# Patient Record
Sex: FEMALE | Race: WHITE | Employment: OTHER | URBAN - METROPOLITAN AREA
[De-identification: names, ages, dates, MRNs, and addresses within clinical notes are randomized per-mention and may not be internally consistent; named-entity substitution may affect disease eponyms.]

---

## 2020-11-21 ENCOUNTER — APPOINTMENT (OUTPATIENT)
Dept: GENERAL RADIOLOGY | Age: 80
DRG: 492 | End: 2020-11-21
Payer: MEDICARE

## 2020-11-21 ENCOUNTER — HOSPITAL ENCOUNTER (INPATIENT)
Age: 80
LOS: 9 days | Discharge: NURSING, REHAB OR LONG-TERM CARE | DRG: 492 | End: 2020-12-01
Attending: EMERGENCY MEDICINE | Admitting: FAMILY MEDICINE
Payer: MEDICARE

## 2020-11-21 PROCEDURE — 85610 PROTHROMBIN TIME: CPT

## 2020-11-21 PROCEDURE — 86900 BLOOD TYPING SEROLOGIC ABO: CPT

## 2020-11-21 PROCEDURE — 86850 RBC ANTIBODY SCREEN: CPT

## 2020-11-21 PROCEDURE — 93005 ELECTROCARDIOGRAM TRACING: CPT | Performed by: EMERGENCY MEDICINE

## 2020-11-21 PROCEDURE — 86901 BLOOD TYPING SEROLOGIC RH(D): CPT

## 2020-11-21 PROCEDURE — 73600 X-RAY EXAM OF ANKLE: CPT

## 2020-11-21 PROCEDURE — 71045 X-RAY EXAM CHEST 1 VIEW: CPT

## 2020-11-21 PROCEDURE — 80053 COMPREHEN METABOLIC PANEL: CPT

## 2020-11-21 PROCEDURE — 99285 EMERGENCY DEPT VISIT HI MDM: CPT

## 2020-11-21 PROCEDURE — 84484 ASSAY OF TROPONIN QUANT: CPT

## 2020-11-21 PROCEDURE — 85025 COMPLETE CBC W/AUTO DIFF WBC: CPT

## 2020-11-21 ASSESSMENT — PAIN DESCRIPTION - LOCATION: LOCATION: ANKLE

## 2020-11-21 ASSESSMENT — PAIN DESCRIPTION - ORIENTATION: ORIENTATION: LEFT

## 2020-11-21 ASSESSMENT — PAIN SCALES - GENERAL: PAINLEVEL_OUTOF10: 2

## 2020-11-21 ASSESSMENT — PAIN DESCRIPTION - DESCRIPTORS: DESCRIPTORS: PATIENT UNABLE TO DESCRIBE

## 2020-11-22 ENCOUNTER — ANESTHESIA EVENT (OUTPATIENT)
Dept: OPERATING ROOM | Age: 80
DRG: 492 | End: 2020-11-22
Payer: MEDICARE

## 2020-11-22 ENCOUNTER — APPOINTMENT (OUTPATIENT)
Dept: GENERAL RADIOLOGY | Age: 80
DRG: 492 | End: 2020-11-22
Payer: MEDICARE

## 2020-11-22 ENCOUNTER — ANESTHESIA (OUTPATIENT)
Dept: OPERATING ROOM | Age: 80
DRG: 492 | End: 2020-11-22
Payer: MEDICARE

## 2020-11-22 VITALS — TEMPERATURE: 96.3 F | OXYGEN SATURATION: 97 %

## 2020-11-22 PROBLEM — Z79.01 SUBTHERAPEUTIC ANTICOAGULATION: Status: ACTIVE | Noted: 2020-11-22

## 2020-11-22 PROBLEM — S82.892B ANKLE FRACTURE, LEFT, OPEN TYPE I OR II, INITIAL ENCOUNTER: Status: ACTIVE | Noted: 2020-11-22

## 2020-11-22 PROBLEM — E11.9 TYPE 2 DIABETES MELLITUS (HCC): Status: ACTIVE | Noted: 2020-11-22

## 2020-11-22 PROBLEM — J44.9 COPD (CHRONIC OBSTRUCTIVE PULMONARY DISEASE) (HCC): Status: ACTIVE | Noted: 2020-11-22

## 2020-11-22 PROBLEM — R26.2 AMBULATORY DYSFUNCTION: Status: ACTIVE | Noted: 2020-11-22

## 2020-11-22 PROBLEM — S82.892A ANKLE FRACTURE, LEFT, CLOSED, INITIAL ENCOUNTER: Status: ACTIVE | Noted: 2020-11-22

## 2020-11-22 PROBLEM — F17.200 TOBACCO DEPENDENCE: Status: ACTIVE | Noted: 2020-11-22

## 2020-11-22 PROBLEM — N18.4 STAGE 4 CHRONIC KIDNEY DISEASE (HCC): Status: ACTIVE | Noted: 2020-11-22

## 2020-11-22 PROBLEM — I45.10 RBBB: Status: ACTIVE | Noted: 2020-11-22

## 2020-11-22 PROBLEM — Z51.81 SUBTHERAPEUTIC ANTICOAGULATION: Status: ACTIVE | Noted: 2020-11-22

## 2020-11-22 PROBLEM — R29.6 FALLS FREQUENTLY: Status: ACTIVE | Noted: 2020-11-22

## 2020-11-22 PROBLEM — R53.1 WEAKNESS: Status: ACTIVE | Noted: 2020-11-22

## 2020-11-22 PROBLEM — Z01.818 PRE-OP EVALUATION: Status: ACTIVE | Noted: 2020-11-22

## 2020-11-22 PROBLEM — Z87.09 HISTORY OF CHRONIC RESPIRATORY FAILURE: Status: ACTIVE | Noted: 2020-11-22

## 2020-11-22 PROBLEM — I48.91 ATRIAL FIBRILLATION (HCC): Status: ACTIVE | Noted: 2020-11-22

## 2020-11-22 LAB
ABO/RH: NORMAL
ALBUMIN SERPL-MCNC: 3.1 G/DL (ref 3.5–5.2)
ALP BLD-CCNC: 101 U/L (ref 35–104)
ALT SERPL-CCNC: 6 U/L (ref 0–32)
ANION GAP SERPL CALCULATED.3IONS-SCNC: 6 MMOL/L (ref 7–16)
ANION GAP SERPL CALCULATED.3IONS-SCNC: 9 MMOL/L (ref 7–16)
ANTIBODY SCREEN: NORMAL
APTT: 27.4 SEC (ref 24.5–35.1)
AST SERPL-CCNC: 11 U/L (ref 0–31)
BASOPHILS ABSOLUTE: 0.05 E9/L (ref 0–0.2)
BASOPHILS ABSOLUTE: 0.06 E9/L (ref 0–0.2)
BASOPHILS RELATIVE PERCENT: 0.7 % (ref 0–2)
BASOPHILS RELATIVE PERCENT: 0.9 % (ref 0–2)
BILIRUB SERPL-MCNC: 0.6 MG/DL (ref 0–1.2)
BUN BLDV-MCNC: 35 MG/DL (ref 8–23)
BUN BLDV-MCNC: 35 MG/DL (ref 8–23)
CALCIUM SERPL-MCNC: 9.4 MG/DL (ref 8.6–10.2)
CALCIUM SERPL-MCNC: 9.9 MG/DL (ref 8.6–10.2)
CHLORIDE BLD-SCNC: 104 MMOL/L (ref 98–107)
CHLORIDE BLD-SCNC: 106 MMOL/L (ref 98–107)
CO2: 25 MMOL/L (ref 22–29)
CO2: 31 MMOL/L (ref 22–29)
CREAT SERPL-MCNC: 2.3 MG/DL (ref 0.5–1)
CREAT SERPL-MCNC: 2.4 MG/DL (ref 0.5–1)
EKG ATRIAL RATE: 64 BPM
EKG P AXIS: 72 DEGREES
EKG P-R INTERVAL: 198 MS
EKG Q-T INTERVAL: 442 MS
EKG QRS DURATION: 142 MS
EKG QTC CALCULATION (BAZETT): 455 MS
EKG R AXIS: 25 DEGREES
EKG T AXIS: -21 DEGREES
EKG VENTRICULAR RATE: 64 BPM
EOSINOPHILS ABSOLUTE: 0.06 E9/L (ref 0.05–0.5)
EOSINOPHILS ABSOLUTE: 0.1 E9/L (ref 0.05–0.5)
EOSINOPHILS RELATIVE PERCENT: 0.7 % (ref 0–6)
EOSINOPHILS RELATIVE PERCENT: 1.7 % (ref 0–6)
GFR AFRICAN AMERICAN: 23
GFR AFRICAN AMERICAN: 25
GFR NON-AFRICAN AMERICAN: 19 ML/MIN/1.73
GFR NON-AFRICAN AMERICAN: 20 ML/MIN/1.73
GLUCOSE BLD-MCNC: 84 MG/DL (ref 74–99)
GLUCOSE BLD-MCNC: 99 MG/DL (ref 74–99)
HBA1C MFR BLD: 5.1 % (ref 4–5.6)
HCT VFR BLD CALC: 41.1 % (ref 34–48)
HCT VFR BLD CALC: 41.7 % (ref 34–48)
HEMOGLOBIN: 12.8 G/DL (ref 11.5–15.5)
HEMOGLOBIN: 12.9 G/DL (ref 11.5–15.5)
IMMATURE GRANULOCYTES #: 0.03 E9/L
IMMATURE GRANULOCYTES #: 0.04 E9/L
IMMATURE GRANULOCYTES %: 0.5 % (ref 0–5)
IMMATURE GRANULOCYTES %: 0.5 % (ref 0–5)
INR BLD: 1.2
LYMPHOCYTES ABSOLUTE: 0.83 E9/L (ref 1.5–4)
LYMPHOCYTES ABSOLUTE: 1.05 E9/L (ref 1.5–4)
LYMPHOCYTES RELATIVE PERCENT: 12.1 % (ref 20–42)
LYMPHOCYTES RELATIVE PERCENT: 14.1 % (ref 20–42)
MCH RBC QN AUTO: 31.2 PG (ref 26–35)
MCH RBC QN AUTO: 31.9 PG (ref 26–35)
MCHC RBC AUTO-ENTMCNC: 30.7 % (ref 32–34.5)
MCHC RBC AUTO-ENTMCNC: 31.4 % (ref 32–34.5)
MCV RBC AUTO: 101.7 FL (ref 80–99.9)
MCV RBC AUTO: 101.7 FL (ref 80–99.9)
METER GLUCOSE: 128 MG/DL (ref 74–99)
METER GLUCOSE: 142 MG/DL (ref 74–99)
METER GLUCOSE: 146 MG/DL (ref 74–99)
METER GLUCOSE: 88 MG/DL (ref 74–99)
MONOCYTES ABSOLUTE: 0.63 E9/L (ref 0.1–0.95)
MONOCYTES ABSOLUTE: 0.82 E9/L (ref 0.1–0.95)
MONOCYTES RELATIVE PERCENT: 10.7 % (ref 2–12)
MONOCYTES RELATIVE PERCENT: 9.5 % (ref 2–12)
NEUTROPHILS ABSOLUTE: 4.23 E9/L (ref 1.8–7.3)
NEUTROPHILS ABSOLUTE: 6.62 E9/L (ref 1.8–7.3)
NEUTROPHILS RELATIVE PERCENT: 72.1 % (ref 43–80)
NEUTROPHILS RELATIVE PERCENT: 76.5 % (ref 43–80)
PDW BLD-RTO: 16.3 FL (ref 11.5–15)
PDW BLD-RTO: 16.5 FL (ref 11.5–15)
PLATELET # BLD: 150 E9/L (ref 130–450)
PLATELET # BLD: 151 E9/L (ref 130–450)
PMV BLD AUTO: 10.2 FL (ref 7–12)
PMV BLD AUTO: 11.1 FL (ref 7–12)
POTASSIUM REFLEX MAGNESIUM: 4.3 MMOL/L (ref 3.5–5)
POTASSIUM SERPL-SCNC: 4.5 MMOL/L (ref 3.5–5)
PROTHROMBIN TIME: 12.9 SEC (ref 9.3–12.4)
RBC # BLD: 4.04 E12/L (ref 3.5–5.5)
RBC # BLD: 4.1 E12/L (ref 3.5–5.5)
SODIUM BLD-SCNC: 138 MMOL/L (ref 132–146)
SODIUM BLD-SCNC: 143 MMOL/L (ref 132–146)
TOTAL PROTEIN: 5.7 G/DL (ref 6.4–8.3)
TROPONIN: 0.02 NG/ML (ref 0–0.03)
TSH SERPL DL<=0.05 MIU/L-ACNC: 1.62 UIU/ML (ref 0.27–4.2)
WBC # BLD: 5.9 E9/L (ref 4.5–11.5)
WBC # BLD: 8.7 E9/L (ref 4.5–11.5)

## 2020-11-22 PROCEDURE — 6360000002 HC RX W HCPCS: Performed by: STUDENT IN AN ORGANIZED HEALTH CARE EDUCATION/TRAINING PROGRAM

## 2020-11-22 PROCEDURE — 6360000002 HC RX W HCPCS

## 2020-11-22 PROCEDURE — 3600000015 HC SURGERY LEVEL 5 ADDTL 15MIN: Performed by: ORTHOPAEDIC SURGERY

## 2020-11-22 PROCEDURE — 51798 US URINE CAPACITY MEASURE: CPT

## 2020-11-22 PROCEDURE — 7100000000 HC PACU RECOVERY - FIRST 15 MIN: Performed by: ORTHOPAEDIC SURGERY

## 2020-11-22 PROCEDURE — 2720000010 HC SURG SUPPLY STERILE: Performed by: ORTHOPAEDIC SURGERY

## 2020-11-22 PROCEDURE — 2500000003 HC RX 250 WO HCPCS

## 2020-11-22 PROCEDURE — 2700000000 HC OXYGEN THERAPY PER DAY

## 2020-11-22 PROCEDURE — 0QSH04Z REPOSITION LEFT TIBIA WITH INTERNAL FIXATION DEVICE, OPEN APPROACH: ICD-10-PCS | Performed by: ORTHOPAEDIC SURGERY

## 2020-11-22 PROCEDURE — 6370000000 HC RX 637 (ALT 250 FOR IP): Performed by: FAMILY MEDICINE

## 2020-11-22 PROCEDURE — 6360000002 HC RX W HCPCS: Performed by: FAMILY MEDICINE

## 2020-11-22 PROCEDURE — 2580000003 HC RX 258: Performed by: EMERGENCY MEDICINE

## 2020-11-22 PROCEDURE — 0QSK04Z REPOSITION LEFT FIBULA WITH INTERNAL FIXATION DEVICE, OPEN APPROACH: ICD-10-PCS | Performed by: ORTHOPAEDIC SURGERY

## 2020-11-22 PROCEDURE — 6360000002 HC RX W HCPCS: Performed by: EMERGENCY MEDICINE

## 2020-11-22 PROCEDURE — 3700000000 HC ANESTHESIA ATTENDED CARE: Performed by: ORTHOPAEDIC SURGERY

## 2020-11-22 PROCEDURE — 2709999900 HC NON-CHARGEABLE SUPPLY: Performed by: ORTHOPAEDIC SURGERY

## 2020-11-22 PROCEDURE — 7100000001 HC PACU RECOVERY - ADDTL 15 MIN: Performed by: ORTHOPAEDIC SURGERY

## 2020-11-22 PROCEDURE — 1200000000 HC SEMI PRIVATE

## 2020-11-22 PROCEDURE — 94664 DEMO&/EVAL PT USE INHALER: CPT

## 2020-11-22 PROCEDURE — 6370000000 HC RX 637 (ALT 250 FOR IP): Performed by: STUDENT IN AN ORGANIZED HEALTH CARE EDUCATION/TRAINING PROGRAM

## 2020-11-22 PROCEDURE — C1713 ANCHOR/SCREW BN/BN,TIS/BN: HCPCS | Performed by: ORTHOPAEDIC SURGERY

## 2020-11-22 PROCEDURE — 3600000005 HC SURGERY LEVEL 5 BASE: Performed by: ORTHOPAEDIC SURGERY

## 2020-11-22 PROCEDURE — 94640 AIRWAY INHALATION TREATMENT: CPT

## 2020-11-22 PROCEDURE — 85025 COMPLETE CBC W/AUTO DIFF WBC: CPT

## 2020-11-22 PROCEDURE — 6360000002 HC RX W HCPCS: Performed by: ANESTHESIOLOGY

## 2020-11-22 PROCEDURE — 80048 BASIC METABOLIC PNL TOTAL CA: CPT

## 2020-11-22 PROCEDURE — 97162 PT EVAL MOD COMPLEX 30 MIN: CPT

## 2020-11-22 PROCEDURE — 85730 THROMBOPLASTIN TIME PARTIAL: CPT

## 2020-11-22 PROCEDURE — 90715 TDAP VACCINE 7 YRS/> IM: CPT | Performed by: EMERGENCY MEDICINE

## 2020-11-22 PROCEDURE — 99222 1ST HOSP IP/OBS MODERATE 55: CPT | Performed by: ORTHOPAEDIC SURGERY

## 2020-11-22 PROCEDURE — 84443 ASSAY THYROID STIM HORMONE: CPT

## 2020-11-22 PROCEDURE — 83036 HEMOGLOBIN GLYCOSYLATED A1C: CPT

## 2020-11-22 PROCEDURE — 3700000001 HC ADD 15 MINUTES (ANESTHESIA): Performed by: ORTHOPAEDIC SURGERY

## 2020-11-22 PROCEDURE — 6370000000 HC RX 637 (ALT 250 FOR IP): Performed by: ORTHOPAEDIC SURGERY

## 2020-11-22 PROCEDURE — 73610 X-RAY EXAM OF ANKLE: CPT

## 2020-11-22 PROCEDURE — 27814 TREATMENT OF ANKLE FRACTURE: CPT | Performed by: ORTHOPAEDIC SURGERY

## 2020-11-22 PROCEDURE — 97530 THERAPEUTIC ACTIVITIES: CPT

## 2020-11-22 PROCEDURE — 90471 IMMUNIZATION ADMIN: CPT | Performed by: EMERGENCY MEDICINE

## 2020-11-22 PROCEDURE — 96374 THER/PROPH/DIAG INJ IV PUSH: CPT

## 2020-11-22 PROCEDURE — 11012 DEB SKIN BONE AT FX SITE: CPT | Performed by: ORTHOPAEDIC SURGERY

## 2020-11-22 PROCEDURE — 27829 TREAT LOWER LEG JOINT: CPT | Performed by: ORTHOPAEDIC SURGERY

## 2020-11-22 PROCEDURE — 3209999900 FLUORO FOR SURGICAL PROCEDURES

## 2020-11-22 PROCEDURE — 93010 ELECTROCARDIOGRAM REPORT: CPT | Performed by: INTERNAL MEDICINE

## 2020-11-22 PROCEDURE — C1769 GUIDE WIRE: HCPCS | Performed by: ORTHOPAEDIC SURGERY

## 2020-11-22 PROCEDURE — 82962 GLUCOSE BLOOD TEST: CPT

## 2020-11-22 PROCEDURE — 36415 COLL VENOUS BLD VENIPUNCTURE: CPT

## 2020-11-22 PROCEDURE — 96375 TX/PRO/DX INJ NEW DRUG ADDON: CPT

## 2020-11-22 PROCEDURE — 2580000003 HC RX 258

## 2020-11-22 PROCEDURE — 2580000003 HC RX 258: Performed by: FAMILY MEDICINE

## 2020-11-22 PROCEDURE — 2580000003 HC RX 258: Performed by: STUDENT IN AN ORGANIZED HEALTH CARE EDUCATION/TRAINING PROGRAM

## 2020-11-22 DEVICE — SCREW BNE L16MM DIA3.5MM CORT S STL ST LOK FULL THRD: Type: IMPLANTABLE DEVICE | Site: TIBIA | Status: FUNCTIONAL

## 2020-11-22 DEVICE — SCREW BNE L46MM DIA3.5MM CORT S STL ST NONCANNULATED LOK: Type: IMPLANTABLE DEVICE | Site: TIBIA | Status: FUNCTIONAL

## 2020-11-22 DEVICE — SCREW BNE L50MM DIA3.5MM CORT S STL ST NONCANNULATED LOK: Type: IMPLANTABLE DEVICE | Site: TIBIA | Status: FUNCTIONAL

## 2020-11-22 DEVICE — SCREW BNE L46MM DIA4MM S STL CANN SHT 1/3 THRD SM HEX SOCK: Type: IMPLANTABLE DEVICE | Site: FIBULA | Status: FUNCTIONAL

## 2020-11-22 DEVICE — SCREW BNE L12MM DIA3.5MM CORT S STL ST NONCANNULATED LOK: Type: IMPLANTABLE DEVICE | Site: TIBIA | Status: FUNCTIONAL

## 2020-11-22 DEVICE — PLATE BNE L117MM 10 H S STL 1/3 TBLR LOK COMPR W/ CLLR FOR: Type: IMPLANTABLE DEVICE | Site: TIBIA | Status: FUNCTIONAL

## 2020-11-22 DEVICE — WASHER ORTH DIA7MM FOR CANN SCR: Type: IMPLANTABLE DEVICE | Site: FIBULA | Status: FUNCTIONAL

## 2020-11-22 RX ORDER — MORPHINE SULFATE 2 MG/ML
2 INJECTION, SOLUTION INTRAMUSCULAR; INTRAVENOUS
Status: DISCONTINUED | OUTPATIENT
Start: 2020-11-22 | End: 2020-12-01 | Stop reason: HOSPADM

## 2020-11-22 RX ORDER — OXYCODONE HYDROCHLORIDE 10 MG/1
10 TABLET ORAL EVERY 4 HOURS PRN
Status: DISCONTINUED | OUTPATIENT
Start: 2020-11-22 | End: 2020-11-22

## 2020-11-22 RX ORDER — TRAMADOL HYDROCHLORIDE 50 MG/1
50 TABLET ORAL EVERY 6 HOURS PRN
Status: DISCONTINUED | OUTPATIENT
Start: 2020-11-22 | End: 2020-12-01 | Stop reason: HOSPADM

## 2020-11-22 RX ORDER — LOVASTATIN 10 MG/1
10 TABLET ORAL NIGHTLY
COMMUNITY

## 2020-11-22 RX ORDER — CEFAZOLIN SODIUM 1 G/3ML
INJECTION, POWDER, FOR SOLUTION INTRAMUSCULAR; INTRAVENOUS PRN
Status: DISCONTINUED | OUTPATIENT
Start: 2020-11-22 | End: 2020-11-22 | Stop reason: SDUPTHER

## 2020-11-22 RX ORDER — ALLOPURINOL 300 MG/1
300 TABLET ORAL DAILY
COMMUNITY

## 2020-11-22 RX ORDER — POTASSIUM CHLORIDE 750 MG/1
10 TABLET, FILM COATED, EXTENDED RELEASE ORAL DAILY
Status: ON HOLD | COMMUNITY
End: 2020-11-30 | Stop reason: HOSPADM

## 2020-11-22 RX ORDER — SODIUM CHLORIDE 9 MG/ML
INJECTION, SOLUTION INTRAVENOUS CONTINUOUS PRN
Status: DISCONTINUED | OUTPATIENT
Start: 2020-11-22 | End: 2020-11-22 | Stop reason: SDUPTHER

## 2020-11-22 RX ORDER — ACETAMINOPHEN 650 MG/1
650 SUPPOSITORY RECTAL EVERY 6 HOURS PRN
Status: DISCONTINUED | OUTPATIENT
Start: 2020-11-22 | End: 2020-11-22 | Stop reason: SDUPTHER

## 2020-11-22 RX ORDER — SODIUM CHLORIDE 0.9 % (FLUSH) 0.9 %
10 SYRINGE (ML) INJECTION PRN
Status: DISCONTINUED | OUTPATIENT
Start: 2020-11-22 | End: 2020-12-01 | Stop reason: HOSPADM

## 2020-11-22 RX ORDER — ONDANSETRON 2 MG/ML
INJECTION INTRAMUSCULAR; INTRAVENOUS PRN
Status: DISCONTINUED | OUTPATIENT
Start: 2020-11-22 | End: 2020-11-22 | Stop reason: SDUPTHER

## 2020-11-22 RX ORDER — SODIUM CHLORIDE 0.9 % (FLUSH) 0.9 %
10 SYRINGE (ML) INJECTION PRN
Status: DISCONTINUED | OUTPATIENT
Start: 2020-11-22 | End: 2020-11-22

## 2020-11-22 RX ORDER — IPRATROPIUM BROMIDE AND ALBUTEROL SULFATE 2.5; .5 MG/3ML; MG/3ML
1 SOLUTION RESPIRATORY (INHALATION) EVERY 4 HOURS PRN
Status: DISCONTINUED | OUTPATIENT
Start: 2020-11-22 | End: 2020-12-01 | Stop reason: HOSPADM

## 2020-11-22 RX ORDER — SODIUM CHLORIDE 0.9 % (FLUSH) 0.9 %
10 SYRINGE (ML) INJECTION EVERY 12 HOURS SCHEDULED
Status: DISCONTINUED | OUTPATIENT
Start: 2020-11-22 | End: 2020-11-22

## 2020-11-22 RX ORDER — PHENYLEPHRINE HCL IN 0.9% NACL 1 MG/10 ML
SYRINGE (ML) INTRAVENOUS PRN
Status: DISCONTINUED | OUTPATIENT
Start: 2020-11-22 | End: 2020-11-22 | Stop reason: SDUPTHER

## 2020-11-22 RX ORDER — SODIUM CHLORIDE 0.9 % (FLUSH) 0.9 %
10 SYRINGE (ML) INJECTION EVERY 12 HOURS SCHEDULED
Status: DISCONTINUED | OUTPATIENT
Start: 2020-11-22 | End: 2020-12-01 | Stop reason: HOSPADM

## 2020-11-22 RX ORDER — OXYCODONE HYDROCHLORIDE 5 MG/1
5 TABLET ORAL EVERY 4 HOURS PRN
Status: DISCONTINUED | OUTPATIENT
Start: 2020-11-22 | End: 2020-11-22

## 2020-11-22 RX ORDER — DEXTROSE MONOHYDRATE 25 G/50ML
12.5 INJECTION, SOLUTION INTRAVENOUS PRN
Status: DISCONTINUED | OUTPATIENT
Start: 2020-11-22 | End: 2020-11-22 | Stop reason: SDUPTHER

## 2020-11-22 RX ORDER — LISINOPRIL 10 MG/1
12.5 TABLET ORAL DAILY
COMMUNITY

## 2020-11-22 RX ORDER — FUROSEMIDE 40 MG/1
60 TABLET ORAL 2 TIMES DAILY
COMMUNITY

## 2020-11-22 RX ORDER — POLYETHYLENE GLYCOL 3350 17 G/17G
17 POWDER, FOR SOLUTION ORAL DAILY PRN
Status: DISCONTINUED | OUTPATIENT
Start: 2020-11-22 | End: 2020-12-01 | Stop reason: HOSPADM

## 2020-11-22 RX ORDER — MEPERIDINE HYDROCHLORIDE 25 MG/ML
12.5 INJECTION INTRAMUSCULAR; INTRAVENOUS; SUBCUTANEOUS EVERY 5 MIN PRN
Status: DISCONTINUED | OUTPATIENT
Start: 2020-11-22 | End: 2020-11-22 | Stop reason: HOSPADM

## 2020-11-22 RX ORDER — PROMETHAZINE HYDROCHLORIDE 25 MG/ML
6.25 INJECTION, SOLUTION INTRAMUSCULAR; INTRAVENOUS
Status: DISCONTINUED | OUTPATIENT
Start: 2020-11-22 | End: 2020-11-22 | Stop reason: HOSPADM

## 2020-11-22 RX ORDER — SENNA PLUS 8.6 MG/1
1 TABLET ORAL DAILY PRN
Status: DISCONTINUED | OUTPATIENT
Start: 2020-11-22 | End: 2020-12-01 | Stop reason: HOSPADM

## 2020-11-22 RX ORDER — FENTANYL CITRATE 50 UG/ML
25 INJECTION, SOLUTION INTRAMUSCULAR; INTRAVENOUS ONCE
Status: COMPLETED | OUTPATIENT
Start: 2020-11-22 | End: 2020-11-22

## 2020-11-22 RX ORDER — NICOTINE POLACRILEX 4 MG
15 LOZENGE BUCCAL PRN
Status: DISCONTINUED | OUTPATIENT
Start: 2020-11-22 | End: 2020-11-22 | Stop reason: SDUPTHER

## 2020-11-22 RX ORDER — GLYCOPYRROLATE 1 MG/5 ML
SYRINGE (ML) INTRAVENOUS PRN
Status: DISCONTINUED | OUTPATIENT
Start: 2020-11-22 | End: 2020-11-22 | Stop reason: SDUPTHER

## 2020-11-22 RX ORDER — ACETAMINOPHEN 650 MG/1
650 SUPPOSITORY RECTAL EVERY 4 HOURS PRN
Status: DISCONTINUED | OUTPATIENT
Start: 2020-11-22 | End: 2020-12-01 | Stop reason: HOSPADM

## 2020-11-22 RX ORDER — ONDANSETRON 2 MG/ML
4 INJECTION INTRAMUSCULAR; INTRAVENOUS EVERY 6 HOURS PRN
Status: DISCONTINUED | OUTPATIENT
Start: 2020-11-22 | End: 2020-12-01 | Stop reason: HOSPADM

## 2020-11-22 RX ORDER — FENTANYL CITRATE 50 UG/ML
25 INJECTION, SOLUTION INTRAMUSCULAR; INTRAVENOUS EVERY 5 MIN PRN
Status: DISCONTINUED | OUTPATIENT
Start: 2020-11-22 | End: 2020-11-22 | Stop reason: HOSPADM

## 2020-11-22 RX ORDER — OXYCODONE HYDROCHLORIDE AND ACETAMINOPHEN 5; 325 MG/1; MG/1
1 TABLET ORAL
Status: DISCONTINUED | OUTPATIENT
Start: 2020-11-22 | End: 2020-11-22 | Stop reason: HOSPADM

## 2020-11-22 RX ORDER — DIAPER,BRIEF,INFANT-TODD,DISP
EACH MISCELLANEOUS PRN
Status: DISCONTINUED | OUTPATIENT
Start: 2020-11-22 | End: 2020-11-22 | Stop reason: HOSPADM

## 2020-11-22 RX ORDER — ACETAMINOPHEN 325 MG/1
650 TABLET ORAL EVERY 6 HOURS PRN
Status: DISCONTINUED | OUTPATIENT
Start: 2020-11-22 | End: 2020-11-22 | Stop reason: SDUPTHER

## 2020-11-22 RX ORDER — AMLODIPINE BESYLATE 5 MG/1
5 TABLET ORAL DAILY
COMMUNITY

## 2020-11-22 RX ORDER — ACETAMINOPHEN 650 MG/1
650 SUPPOSITORY RECTAL EVERY 6 HOURS PRN
Status: DISCONTINUED | OUTPATIENT
Start: 2020-11-22 | End: 2020-11-22

## 2020-11-22 RX ORDER — LIDOCAINE HYDROCHLORIDE 20 MG/ML
INJECTION, SOLUTION INTRAVENOUS PRN
Status: DISCONTINUED | OUTPATIENT
Start: 2020-11-22 | End: 2020-11-22 | Stop reason: SDUPTHER

## 2020-11-22 RX ORDER — MORPHINE SULFATE 4 MG/ML
4 INJECTION, SOLUTION INTRAMUSCULAR; INTRAVENOUS
Status: DISCONTINUED | OUTPATIENT
Start: 2020-11-22 | End: 2020-12-01 | Stop reason: HOSPADM

## 2020-11-22 RX ORDER — IPRATROPIUM BROMIDE AND ALBUTEROL SULFATE 2.5; .5 MG/3ML; MG/3ML
1 SOLUTION RESPIRATORY (INHALATION)
Status: DISCONTINUED | OUTPATIENT
Start: 2020-11-22 | End: 2020-12-01 | Stop reason: HOSPADM

## 2020-11-22 RX ORDER — DEXAMETHASONE SODIUM PHOSPHATE 10 MG/ML
INJECTION INTRAMUSCULAR; INTRAVENOUS PRN
Status: DISCONTINUED | OUTPATIENT
Start: 2020-11-22 | End: 2020-11-22 | Stop reason: SDUPTHER

## 2020-11-22 RX ORDER — DEXTROSE MONOHYDRATE 50 MG/ML
100 INJECTION, SOLUTION INTRAVENOUS PRN
Status: DISCONTINUED | OUTPATIENT
Start: 2020-11-22 | End: 2020-11-22 | Stop reason: SDUPTHER

## 2020-11-22 RX ORDER — LEVOTHYROXINE SODIUM 88 UG/1
88 TABLET ORAL DAILY
COMMUNITY

## 2020-11-22 RX ORDER — PROPOFOL 10 MG/ML
INJECTION, EMULSION INTRAVENOUS PRN
Status: DISCONTINUED | OUTPATIENT
Start: 2020-11-22 | End: 2020-11-22 | Stop reason: SDUPTHER

## 2020-11-22 RX ORDER — MORPHINE SULFATE 2 MG/ML
2 INJECTION, SOLUTION INTRAMUSCULAR; INTRAVENOUS EVERY 4 HOURS PRN
Status: DISCONTINUED | OUTPATIENT
Start: 2020-11-22 | End: 2020-11-22

## 2020-11-22 RX ORDER — DIPHENHYDRAMINE HYDROCHLORIDE 50 MG/ML
12.5 INJECTION INTRAMUSCULAR; INTRAVENOUS
Status: DISCONTINUED | OUTPATIENT
Start: 2020-11-22 | End: 2020-11-22 | Stop reason: HOSPADM

## 2020-11-22 RX ORDER — DEXTROSE MONOHYDRATE 50 MG/ML
100 INJECTION, SOLUTION INTRAVENOUS PRN
Status: DISCONTINUED | OUTPATIENT
Start: 2020-11-22 | End: 2020-12-01 | Stop reason: HOSPADM

## 2020-11-22 RX ORDER — FENTANYL CITRATE 50 UG/ML
50 INJECTION, SOLUTION INTRAMUSCULAR; INTRAVENOUS EVERY 5 MIN PRN
Status: DISCONTINUED | OUTPATIENT
Start: 2020-11-22 | End: 2020-11-22 | Stop reason: HOSPADM

## 2020-11-22 RX ORDER — LEVOTHYROXINE SODIUM 88 UG/1
88 TABLET ORAL DAILY
Status: DISCONTINUED | OUTPATIENT
Start: 2020-11-22 | End: 2020-12-01 | Stop reason: HOSPADM

## 2020-11-22 RX ORDER — OXYCODONE HYDROCHLORIDE AND ACETAMINOPHEN 5; 325 MG/1; MG/1
1 TABLET ORAL EVERY 6 HOURS PRN
Qty: 28 TABLET | Refills: 0 | Status: SHIPPED | OUTPATIENT
Start: 2020-11-22 | End: 2020-11-29

## 2020-11-22 RX ORDER — DEXTROSE MONOHYDRATE 25 G/50ML
12.5 INJECTION, SOLUTION INTRAVENOUS PRN
Status: DISCONTINUED | OUTPATIENT
Start: 2020-11-22 | End: 2020-12-01 | Stop reason: HOSPADM

## 2020-11-22 RX ORDER — NICOTINE POLACRILEX 4 MG
15 LOZENGE BUCCAL PRN
Status: DISCONTINUED | OUTPATIENT
Start: 2020-11-22 | End: 2020-12-01 | Stop reason: HOSPADM

## 2020-11-22 RX ORDER — PROMETHAZINE HYDROCHLORIDE 25 MG/1
12.5 TABLET ORAL EVERY 6 HOURS PRN
Status: DISCONTINUED | OUTPATIENT
Start: 2020-11-22 | End: 2020-12-01 | Stop reason: HOSPADM

## 2020-11-22 RX ORDER — ACETAMINOPHEN 325 MG/1
650 TABLET ORAL EVERY 6 HOURS PRN
Status: DISCONTINUED | OUTPATIENT
Start: 2020-11-22 | End: 2020-11-22

## 2020-11-22 RX ORDER — TRAMADOL HYDROCHLORIDE 50 MG/1
100 TABLET ORAL EVERY 6 HOURS PRN
Status: DISCONTINUED | OUTPATIENT
Start: 2020-11-22 | End: 2020-12-01 | Stop reason: HOSPADM

## 2020-11-22 RX ORDER — NEOSTIGMINE METHYLSULFATE 1 MG/ML
INJECTION, SOLUTION INTRAVENOUS PRN
Status: DISCONTINUED | OUTPATIENT
Start: 2020-11-22 | End: 2020-11-22 | Stop reason: SDUPTHER

## 2020-11-22 RX ORDER — MIDAZOLAM HYDROCHLORIDE 1 MG/ML
INJECTION INTRAMUSCULAR; INTRAVENOUS PRN
Status: DISCONTINUED | OUTPATIENT
Start: 2020-11-22 | End: 2020-11-22 | Stop reason: SDUPTHER

## 2020-11-22 RX ORDER — ROCURONIUM BROMIDE 10 MG/ML
INJECTION, SOLUTION INTRAVENOUS PRN
Status: DISCONTINUED | OUTPATIENT
Start: 2020-11-22 | End: 2020-11-22 | Stop reason: SDUPTHER

## 2020-11-22 RX ORDER — OXYCODONE HYDROCHLORIDE AND ACETAMINOPHEN 5; 325 MG/1; MG/1
1 TABLET ORAL EVERY 4 HOURS PRN
Status: DISCONTINUED | OUTPATIENT
Start: 2020-11-22 | End: 2020-11-22

## 2020-11-22 RX ORDER — AMLODIPINE BESYLATE 5 MG/1
5 TABLET ORAL DAILY
Status: DISCONTINUED | OUTPATIENT
Start: 2020-11-22 | End: 2020-12-01 | Stop reason: HOSPADM

## 2020-11-22 RX ORDER — ACETAMINOPHEN 325 MG/1
650 TABLET ORAL EVERY 4 HOURS PRN
Status: DISCONTINUED | OUTPATIENT
Start: 2020-11-22 | End: 2020-12-01 | Stop reason: HOSPADM

## 2020-11-22 RX ORDER — FENTANYL CITRATE 50 UG/ML
INJECTION, SOLUTION INTRAMUSCULAR; INTRAVENOUS PRN
Status: DISCONTINUED | OUTPATIENT
Start: 2020-11-22 | End: 2020-11-22 | Stop reason: SDUPTHER

## 2020-11-22 RX ADMIN — SODIUM CHLORIDE: 9 INJECTION, SOLUTION INTRAVENOUS at 08:43

## 2020-11-22 RX ADMIN — FENTANYL CITRATE 50 MCG: 50 INJECTION, SOLUTION INTRAMUSCULAR; INTRAVENOUS at 10:38

## 2020-11-22 RX ADMIN — SODIUM CHLORIDE: 9 INJECTION, SOLUTION INTRAVENOUS at 08:28

## 2020-11-22 RX ADMIN — Medication 3 MG: at 10:05

## 2020-11-22 RX ADMIN — Medication 0.6 MG: at 10:05

## 2020-11-22 RX ADMIN — ROCURONIUM BROMIDE 35 MG: 10 INJECTION, SOLUTION INTRAVENOUS at 08:51

## 2020-11-22 RX ADMIN — FENTANYL CITRATE 50 MCG: 50 INJECTION, SOLUTION INTRAMUSCULAR; INTRAVENOUS at 11:05

## 2020-11-22 RX ADMIN — DEXAMETHASONE SODIUM PHOSPHATE 10 MG: 10 INJECTION INTRAMUSCULAR; INTRAVENOUS at 09:10

## 2020-11-22 RX ADMIN — BENZOCAINE AND MENTHOL 1 LOZENGE: 15; 3.6 LOZENGE ORAL at 16:46

## 2020-11-22 RX ADMIN — APIXABAN 2.5 MG: 2.5 TABLET, FILM COATED ORAL at 20:31

## 2020-11-22 RX ADMIN — TRAMADOL HYDROCHLORIDE 100 MG: 50 TABLET, COATED ORAL at 16:41

## 2020-11-22 RX ADMIN — INSULIN LISPRO 2 UNITS: 100 INJECTION, SOLUTION INTRAVENOUS; SUBCUTANEOUS at 17:35

## 2020-11-22 RX ADMIN — FENTANYL CITRATE 20 MCG: 50 INJECTION, SOLUTION INTRAMUSCULAR; INTRAVENOUS at 09:29

## 2020-11-22 RX ADMIN — MIDAZOLAM 0.5 MG: 1 INJECTION INTRAMUSCULAR; INTRAVENOUS at 08:43

## 2020-11-22 RX ADMIN — IPRATROPIUM BROMIDE AND ALBUTEROL SULFATE 1 AMPULE: .5; 3 SOLUTION RESPIRATORY (INHALATION) at 16:52

## 2020-11-22 RX ADMIN — Medication 10 ML: at 20:31

## 2020-11-22 RX ADMIN — Medication 100 MCG: at 08:58

## 2020-11-22 RX ADMIN — IPRATROPIUM BROMIDE AND ALBUTEROL SULFATE 1 AMPULE: .5; 3 SOLUTION RESPIRATORY (INHALATION) at 12:09

## 2020-11-22 RX ADMIN — CEFAZOLIN 2000 MG: 1 INJECTION, POWDER, FOR SOLUTION INTRAMUSCULAR; INTRAVENOUS at 09:14

## 2020-11-22 RX ADMIN — SODIUM CHLORIDE, PRESERVATIVE FREE 10 ML: 5 INJECTION INTRAVENOUS at 17:36

## 2020-11-22 RX ADMIN — Medication 100 MCG: at 08:52

## 2020-11-22 RX ADMIN — ONDANSETRON HYDROCHLORIDE 4 MG: 2 INJECTION, SOLUTION INTRAMUSCULAR; INTRAVENOUS at 09:14

## 2020-11-22 RX ADMIN — FENTANYL CITRATE 20 MCG: 50 INJECTION, SOLUTION INTRAMUSCULAR; INTRAVENOUS at 09:37

## 2020-11-22 RX ADMIN — LEVOTHYROXINE SODIUM 88 MCG: 0.09 TABLET ORAL at 14:41

## 2020-11-22 RX ADMIN — ACETAMINOPHEN 650 MG: 325 TABLET ORAL at 14:47

## 2020-11-22 RX ADMIN — MORPHINE SULFATE 2 MG: 2 INJECTION, SOLUTION INTRAMUSCULAR; INTRAVENOUS at 06:29

## 2020-11-22 RX ADMIN — FENTANYL CITRATE 20 MCG: 50 INJECTION, SOLUTION INTRAMUSCULAR; INTRAVENOUS at 08:51

## 2020-11-22 RX ADMIN — FENTANYL CITRATE 25 MCG: 50 INJECTION, SOLUTION INTRAMUSCULAR; INTRAVENOUS at 00:27

## 2020-11-22 RX ADMIN — OXYCODONE AND ACETAMINOPHEN 1 TABLET: 5; 325 TABLET ORAL at 04:15

## 2020-11-22 RX ADMIN — LIDOCAINE HYDROCHLORIDE 80 MG: 20 INJECTION, SOLUTION INTRAVENOUS at 08:51

## 2020-11-22 RX ADMIN — FENTANYL CITRATE 20 MCG: 50 INJECTION, SOLUTION INTRAMUSCULAR; INTRAVENOUS at 08:35

## 2020-11-22 RX ADMIN — SODIUM CHLORIDE, PRESERVATIVE FREE 10 ML: 5 INJECTION INTRAVENOUS at 06:28

## 2020-11-22 RX ADMIN — TETANUS TOXOID, REDUCED DIPHTHERIA TOXOID AND ACELLULAR PERTUSSIS VACCINE, ADSORBED 0.5 ML: 5; 2.5; 8; 8; 2.5 SUSPENSION INTRAMUSCULAR at 00:19

## 2020-11-22 RX ADMIN — PROPOFOL 80 MG: 10 INJECTION, EMULSION INTRAVENOUS at 08:51

## 2020-11-22 RX ADMIN — FENTANYL CITRATE 20 MCG: 50 INJECTION, SOLUTION INTRAMUSCULAR; INTRAVENOUS at 09:14

## 2020-11-22 RX ADMIN — Medication 2 G: at 17:34

## 2020-11-22 RX ADMIN — IPRATROPIUM BROMIDE AND ALBUTEROL SULFATE 1 AMPULE: .5; 3 SOLUTION RESPIRATORY (INHALATION) at 21:30

## 2020-11-22 RX ADMIN — INSULIN LISPRO 1 UNITS: 100 INJECTION, SOLUTION INTRAVENOUS; SUBCUTANEOUS at 20:29

## 2020-11-22 RX ADMIN — CEFAZOLIN 1 G: 1 INJECTION, POWDER, FOR SOLUTION INTRAMUSCULAR; INTRAVENOUS at 00:07

## 2020-11-22 RX ADMIN — MORPHINE SULFATE 2 MG: 2 INJECTION, SOLUTION INTRAMUSCULAR; INTRAVENOUS at 02:07

## 2020-11-22 ASSESSMENT — PULMONARY FUNCTION TESTS
PIF_VALUE: 27
PIF_VALUE: 1
PIF_VALUE: 27
PIF_VALUE: 28
PIF_VALUE: 1
PIF_VALUE: 27
PIF_VALUE: 26
PIF_VALUE: 26
PIF_VALUE: 1
PIF_VALUE: 26
PIF_VALUE: 12
PIF_VALUE: 27
PIF_VALUE: 2
PIF_VALUE: 1
PIF_VALUE: 18
PIF_VALUE: 21
PIF_VALUE: 27
PIF_VALUE: 26
PIF_VALUE: 1
PIF_VALUE: 3
PIF_VALUE: 28
PIF_VALUE: 1
PIF_VALUE: 26
PIF_VALUE: 29
PIF_VALUE: 27
PIF_VALUE: 1
PIF_VALUE: 1
PIF_VALUE: 27
PIF_VALUE: 27
PIF_VALUE: 14
PIF_VALUE: 1
PIF_VALUE: 27
PIF_VALUE: 1
PIF_VALUE: 27
PIF_VALUE: 2
PIF_VALUE: 1
PIF_VALUE: 3
PIF_VALUE: 24
PIF_VALUE: 1
PIF_VALUE: 25
PIF_VALUE: 27
PIF_VALUE: 26
PIF_VALUE: 27
PIF_VALUE: 0
PIF_VALUE: 27
PIF_VALUE: 1
PIF_VALUE: 24
PIF_VALUE: 28
PIF_VALUE: 12
PIF_VALUE: 27
PIF_VALUE: 1
PIF_VALUE: 28
PIF_VALUE: 26
PIF_VALUE: 27
PIF_VALUE: 27
PIF_VALUE: 2
PIF_VALUE: 1
PIF_VALUE: 27
PIF_VALUE: 27
PIF_VALUE: 1
PIF_VALUE: 26
PIF_VALUE: 27
PIF_VALUE: 27
PIF_VALUE: 25
PIF_VALUE: 27
PIF_VALUE: 28
PIF_VALUE: 4
PIF_VALUE: 30
PIF_VALUE: 27
PIF_VALUE: 27
PIF_VALUE: 26
PIF_VALUE: 3
PIF_VALUE: 25
PIF_VALUE: 26
PIF_VALUE: 1
PIF_VALUE: 27
PIF_VALUE: 28
PIF_VALUE: 16
PIF_VALUE: 2
PIF_VALUE: 1
PIF_VALUE: 29
PIF_VALUE: 27
PIF_VALUE: 1
PIF_VALUE: 2
PIF_VALUE: 24
PIF_VALUE: 3
PIF_VALUE: 27
PIF_VALUE: 27
PIF_VALUE: 1
PIF_VALUE: 24
PIF_VALUE: 27
PIF_VALUE: 27
PIF_VALUE: 19
PIF_VALUE: 25
PIF_VALUE: 28
PIF_VALUE: 15
PIF_VALUE: 27
PIF_VALUE: 1
PIF_VALUE: 26
PIF_VALUE: 26
PIF_VALUE: 27
PIF_VALUE: 12
PIF_VALUE: 22
PIF_VALUE: 2
PIF_VALUE: 1
PIF_VALUE: 28
PIF_VALUE: 27
PIF_VALUE: 26
PIF_VALUE: 28
PIF_VALUE: 2
PIF_VALUE: 1
PIF_VALUE: 28
PIF_VALUE: 4

## 2020-11-22 ASSESSMENT — PAIN SCALES - GENERAL
PAINLEVEL_OUTOF10: 0
PAINLEVEL_OUTOF10: 7
PAINLEVEL_OUTOF10: 0
PAINLEVEL_OUTOF10: 7
PAINLEVEL_OUTOF10: 7
PAINLEVEL_OUTOF10: 3
PAINLEVEL_OUTOF10: 2
PAINLEVEL_OUTOF10: 2
PAINLEVEL_OUTOF10: 7
PAINLEVEL_OUTOF10: 0
PAINLEVEL_OUTOF10: 7
PAINLEVEL_OUTOF10: 7
PAINLEVEL_OUTOF10: 3

## 2020-11-22 ASSESSMENT — PAIN DESCRIPTION - FREQUENCY
FREQUENCY: CONTINUOUS

## 2020-11-22 ASSESSMENT — PAIN DESCRIPTION - PAIN TYPE
TYPE: ACUTE PAIN

## 2020-11-22 ASSESSMENT — PAIN DESCRIPTION - PROGRESSION: CLINICAL_PROGRESSION: NOT CHANGED

## 2020-11-22 ASSESSMENT — PAIN DESCRIPTION - ORIENTATION
ORIENTATION: LEFT

## 2020-11-22 ASSESSMENT — PAIN DESCRIPTION - LOCATION
LOCATION: ANKLE

## 2020-11-22 ASSESSMENT — LIFESTYLE VARIABLES: SMOKING_STATUS: 1

## 2020-11-22 ASSESSMENT — PAIN DESCRIPTION - DESCRIPTORS
DESCRIPTORS: DISCOMFORT;ACHING;SORE
DESCRIPTORS: ACHING;DISCOMFORT;SORE
DESCRIPTORS: ACHING;CONSTANT;SORE

## 2020-11-22 ASSESSMENT — PAIN - FUNCTIONAL ASSESSMENT
PAIN_FUNCTIONAL_ASSESSMENT: PREVENTS OR INTERFERES WITH ALL ACTIVE AND SOME PASSIVE ACTIVITIES
PAIN_FUNCTIONAL_ASSESSMENT: PREVENTS OR INTERFERES WITH ALL ACTIVE AND SOME PASSIVE ACTIVITIES

## 2020-11-22 ASSESSMENT — PAIN DESCRIPTION - ONSET
ONSET: ON-GOING

## 2020-11-22 NOTE — H&P
Hospital Medicine History & Physical      PCP: Rena Maya MD    Date of Admission: 11/21/2020    Date of Service: Pt seen/examined on 11/21/2020 and Admitted to Inpatient with expected LOS greater than two midnights due to medical therapy. Chief Complaint: Accidental fall      History Of Present Illness:      [de-identified] y.o. female who presented to [facility] with past medical history of stage IV chronic kidney disease, COPD, oxygen dependent on 2 L at home, tobacco dependence, hypertension, gout, hyperlipidemia, DVT in both legs 5 years ago, atrial fibrillation on anticoagulation with Eliquis, hypothyroidism, chronic diastolic CHF, diabetes, severe pulmonary hypertension, ambulatory dysfunction and frequent falls. Patient has been feeling weak for some days. She reports having had a fall earlier in the day yesterday that required EMS coming into the house to pick her up. Patient went into the bathroom and got stuck on the toilet. Her  who is also elderly was trying to help her get up when she fell and had her ankle twisted. She reports bumping her head and her bottom. No loss of consciousness, she complains of severe pain involving the left ankle, pain is throbbing, constant, associated with open wound in the leg, got some relief with IV pain medication in the ER. This did not last long. Leg is currently immobilized. She denies any chest pain or shortness of breath, no cough or fever. No abdominal pain. Appetite has been reduced. No urinary or bowel complaint    Vital signs are stable, labs notable for creatinine of 2.4 which is around baseline for her. CO2 31, INR 1.2 and normal CBC. EKG shows sinus rhythm rate of 64 with PVCs old right bundle branch block. Chest x-ray is negative. X-ray of the ankle shows displaced fractures of distal shaft of the medial malleolus, displaced fracture of distal fibula and soft tissue edema.   Documented echo in 2012 showed EF of 65 to 70% with grade 1 diastolic dysfunction, severe pulmonary hypertension mild TR. Stress test in 2012 was negative. She is being admitted for further management above. Past Medical History:  above    History reviewed. No pertinent past medical history. Past Surgical History: D&C and vaginal deliveries x2      Medications Prior to Admission: This is currently not available     Prior to Admission medications    Not on File       Allergies:  Amoxicillin and Ciprofloxacin    Social History:      The patient currently lives with her . TOBACCO:   reports that she has been smoking. She has never used smokeless tobacco.  ETOH:   reports current alcohol use. Socially      Family History:     Reviewed in detail Positive as follows: Diabetes      REVIEW OF SYSTEMS:   Pertinent positives as noted in the HPI. All other systems reviewed and negative. PHYSICAL EXAM:    /81   Pulse 65   Temp 97.4 °F (36.3 °C)   Resp 18   Ht 5' 2\" (1.575 m)   Wt 160 lb (72.6 kg)   SpO2 95%   BMI 29.26 kg/m²     General appearance: Elderly female, pleasant, mild painful distress, appears stated age and cooperative. Afebrile. HEENT:  Normal cephalic, atraumatic without obvious deformity. Pupils equal, round, and reactive to light. Extra ocular muscles intact. Conjunctivae/corneas clear. Neck: Supple, with full range of motion. No jugular venous distention. Trachea midline. Respiratory:  Normal respiratory effort. Decreased air movement with scattered wheezes/Rhonchi. Cardiovascular:  Regular rate and rhythm with normal S1/S2 without murmurs, rubs or gallops. Abdomen: Soft, non-tender, non-distended with normal bowel sounds. Musculoskeletal:  No clubbing/cyanosis, 2+ edema right leg. Limited range of motion with left leg in cast  Skin: Skin color, texture, turgor normal.  No rashes or lesions. Neurologic:  Cranial nerves: II-XII intact, grossly non-focal.  Limited movement of left leg is due to fracture.   Psychiatric:  Alert and oriented, thought content appropriate, normal insight  Capillary Refill: Brisk,< 3 seconds   Peripheral Pulses: DP pulses not palpable      Labs:     Recent Labs     11/21/20 2359   WBC 5.9   HGB 12.8   HCT 41.7        Recent Labs     11/21/20  2359      K 4.5      CO2 31*   BUN 35*   CREATININE 2.4*   CALCIUM 9.9     Recent Labs     11/21/20  2359   AST 11   ALT 6   BILITOT 0.6   ALKPHOS 101     Recent Labs     11/21/20 2359   INR 1.2     Recent Labs     11/21/20 2359   TROPONINI 0.02       Urinalysis:    No results found for: [place], BACTERIA, RBCUA, BLOODU, [address], [address]    Radiology:   Reviewed and documented    XR CHEST PORTABLE   Final Result   No evidence of acute trauma. Cardiomegaly. XR ANKLE LEFT (MIN 3 VIEWS)   Final Result   Displaced fractures of distal shaft of the medial malleolus. Fine osseous   detail obscured by the overlapping cast.      XR ANKLE LEFT (2 VIEWS)   Final Result   Displaced fracture distal shaft of the fibula. Displaced malleolar fracture. Diffuse soft tissue edema. ASSESSMENT:    Active Hospital Problems    Diagnosis Date Noted    Subtherapeutic anticoagulation [Z51.81, Z79.01] 11/22/2020    Ankle fracture, left, open type I or II, initial encounter [S82.892B] 11/22/2020    Weakness [R53.1] 11/22/2020    Ambulatory dysfunction [R26.2] 11/22/2020    Falls frequently [R29.6] 11/22/2020    Stage 4 chronic kidney disease ([address] [N18.4] 11/22/2020    RBBB [I45.10] 11/22/2020    COPD (chronic obstructive pulmonary disease) ([address] [J44.9] 11/22/2020    History of chronic respiratory failure [Z87.09] 11/22/2020    Tobacco dependence [F17.200] 11/22/2020    Atrial fibrillation ([address] [I48.91] 11/22/2020    Pre-op evaluation [Z01.818] 11/22/2020   . DM  . Grade 2 diastolic dysfunction    PLAN:  1. Open displaced distal fibula and malleolar fractures for surgical repair. Orthopedic consult. Pain control.   2.  Preop

## 2020-11-22 NOTE — PROGRESS NOTES
Physical Therapy    PT orders received and appreciated. Attempted evaluation this PM.  On first attempt, pt was found to have open skin tear. RN notified. Upon second attempt, pt sitting up eating lunch and declining PT intervention at this time. Will re-attempt at a later time/date.     Jackie Juarez, PT, DPT  [id number]

## 2020-11-22 NOTE — PROGRESS NOTES
Physical Therapy  Physical Therapy Initial Assessment   Name: Sheron Kirby  : 1940  MRN: [id number]    Referring Provider:  Ana Paula Miller DO    Date of Service: 2020    Evaluating PT:  Juan Santos PT, DPT [id number]    Room #:  6599/0221-P  Diagnosis:  Ankle fracture L, closed, initial encounter  PMHx/PSHx:  None on file  Procedure/Surgery:  2020  1. Irrigation and debridement left grade 2 open ankle fracture including skin, subcutaneous tissue, muscle, and bone  2. Open reduction internal fixation left bimalleolar ankle fracture  3. Open reduction internal fixation left ankle syndesmosis  Precautions:  Falls, NWB LLE, poor skin integrity   Equipment Needs:  TBD    SUBJECTIVE:    Pt lives with  in a 3 story home with 3 stairs to enter and 1 rail. Bed is on first floor and bath is on first floor. Pt ambulated with front [employer] PTA. Pt reports she stays on first floor only. Pt owns BSC but does not use it. OBJECTIVE:   Initial Evaluation  Date: 2020 Treatment Short Term/ Long Term   Goals   AM-PAC 6 Clicks 48/94     Was pt agreeable to Eval/treatment? yes     Does pt have pain? LLE soreness     Bed Mobility  Rolling: Reji  Supine to sit: Reji  Sit to supine: Reji  Scooting: Reji  Rolling: Independent  Supine to sit:  Independent  Sit to supine: Independent  Scooting: Independent   Transfers Sit to stand: NT, pt declined  Stand to sit: NT  Stand pivot: NT  Sit to stand: Reji  Stand to sit: Reji  Stand pivot: Reji front [employer]   Ambulation    NT  10 feet with front [employer] Reji NWB LLE   Stair negotiation: ascended and descended  NT  NA   ROM BUE:  Per OT note  BLE:  WNL     Strength BUE:  Per OT note  BLE:  WNL     Balance Sitting EOB:  SBA  Dynamic Standing:  NT  Sitting EOB:  Independent  Dynamic Standing:  Reji front [employer] with NWB LLE     Pt is A & O x 4  Sensation:  Pt denies numbness and tingling to extremities  Edema:  Unremarkable  Skin: Skin tear on L forearm, (RN aware)    Vitals:  Spo2 monitored throughout session. Pt sitting at EOB on 2L NC 85%. Increased O2 up to 5L with little change on oximeter noted. Oximeter is likely reading inaccurately. Patient education  Pt educated on role of PT intervention. Pt educated on safety in room with utilization of call light for assistance with mobility. Pt educated on importance of maximizing OOB time by transferring to bedside chair for meals and ambulating to bathroom/transferring to bedside commode with assistance from nursing and therapy staff to increase functional activity tolerance and overall functional independence. Pt educated on weight bearing restrictions. Patient response to education:   Pt verbalized understanding Pt demonstrated skill Pt requires further education in this area   yes yes yes     ASSESSMENT:    Comments:  RN cleared pt for activity prior to session. Pt received supine in bed and agreeable to PT intervention at this time. Pt performed all functional mobility as noted above. Pt assisted to EOB but she declined to stand. Pt sat at EOB x 15 minutes while vitals were monitored. Pt demonstrating good mobility of L knee and toes but is complaining of mild numbness in LLE. Multiple attempts were made to encourage pt to attempt to stand, but she declined stating she would stand tomorrow. Pt returned to supine and repositioned for comfort. Pt left with all needs met and call light in reach. Additional time required to complete session d/t pt's pain. Pt requires continued skilled PT intervention for the purposes of maximizing functional mobility and independence. Treatment:  Patient practiced and was instructed in the following treatment:     Therapeutic Activities Completed:  o Functional mobility as noted above:   - Bed mobility: Reji all aspects. Increased time and effort to complete. Pt assisted with LLE.   Cues for efficient use of BUE on bed rail for more independent completion DPT  [id number]

## 2020-11-22 NOTE — ANESTHESIA POSTPROCEDURE EVALUATION
Department of Anesthesiology  Postprocedure Note    Patient: Eliecer Cantor  MRN: [id number]  YOB: 1940  Date of evaluation: 11/22/2020  Time:  12:24 PM     Procedure Summary     Date:  11/22/20 Room / Location:  [facility] OR 09 / [facility]    Anesthesia Start:  6667 Anesthesia Stop:  0720    Procedure:  LEFT ANKLE IRRIGATION AND DEBRIDEMENT WITH  OPEN REDUCTION INTERNAL FIXATION (Left Leg Lower) Diagnosis:  (left ankle fracture)    Surgeon:  Jerzy Mcgraw MD Responsible Provider:  Magnolia Montelongo MD    Anesthesia Type:  general ASA Status:  4 - Emergent          Anesthesia Type: No value filed. Roberto Phase I: Roberto Score: 9    Roberto Phase II:      Last vitals: Reviewed and per EMR flowsheets. Anesthesia Post Evaluation    Patient location during evaluation: PACU  Patient participation: complete - patient participated  Level of consciousness: awake  Airway patency: patent  Nausea & Vomiting: no vomiting and no nausea  Complications: no  Cardiovascular status: hemodynamically stable  Respiratory status: acceptable  Hydration status: stable  Comments: Bruising noted at left radial art line site (after art line removed by PACU RN). She is on eliquis and has ecchymotic areas of skin on both arms. Patient able to move left hand and all five fingers. No sensory deficit in left hand. Patient is A&O x 3 and responding appropriately to questions. Ok to d/c from PACU.

## 2020-11-22 NOTE — CONSULTS
Department of Orthopedic Surgery  Resident Consult Note          Reason for Consult: Left ankle pain    HISTORY OF PRESENT ILLNESS:       Patient is a [de-identified] y.o. female who presents with left ankle pain after fall at home earlier this evening. Patient states she was sitting on the toilet when she attempted to get up and start walking. She lost her balance, rolling her ankle in the process of falling to the ground. She experienced immediate pain in the left ankle and noticed a significant wound over the medial ankle. Patient was subsequently brought in to the emergency department. Orthopedics was consulted for further evaluation and management. Currently, patient admits to pain diffusely in the left ankle. She denies numbness/tingling/paresthesias. Denies any other orthopedic complaints at this time. Patient medical history significant for diabetes. Patient does ambulate with assistive device at baseline. Past Medical History:    History reviewed. No pertinent past medical history. Past Surgical History:    History reviewed. No pertinent surgical history. Current Medications:   Current Facility-Administered Medications: morphine (PF) injection 2 mg, 2 mg, Intravenous, Q4H PRN  oxyCODONE-acetaminophen (PERCOCET) 5-325 MG per tablet 1 tablet, 1 tablet, Oral, Q4H PRN  sodium chloride flush 0.9 % injection 10 mL, 10 mL, Intravenous, 2 times per day  sodium chloride flush 0.9 % injection 10 mL, 10 mL, Intravenous, PRN  acetaminophen (TYLENOL) tablet 650 mg, 650 mg, Oral, Q6H PRN **OR** acetaminophen (TYLENOL) suppository 650 mg, 650 mg, Rectal, Q6H PRN  polyethylene glycol (GLYCOLAX) packet 17 g, 17 g, Oral, Daily PRN  promethazine (PHENERGAN) tablet 12.5 mg, 12.5 mg, Oral, Q6H PRN **OR** ondansetron (ZOFRAN) injection 4 mg, 4 mg, Intravenous, Q6H PRN  Allergies:  Amoxicillin and Ciprofloxacin    Social History:   TOBACCO:   reports that she has been smoking.  She has never used smokeless tobacco.  ETOH: reports current alcohol use. DRUGS:   reports no history of drug use. ACTIVITIES OF DAILY LIVING:    OCCUPATION:    Family History:   History reviewed. No pertinent family history. REVIEW OF SYSTEMS:  CONSTITUTIONAL:  negative for  fevers, chills  EYES:  negative for blurred vision, visual disturbance  HEENT:  negative for  hearing loss, voice change  RESPIRATORY:  negative for  dyspnea, wheezing  CARDIOVASCULAR:  negative for  chest pain, palpitations  GASTROINTESTINAL:  negative for nausea, vomiting  GENITOURINARY:  negative for frequency, urinary incontinence  HEMATOLOGIC/LYMPHATIC:  negative for bleeding and petechiae  MUSCULOSKELETAL:  positive for left ankle pain   NEUROLOGICAL:  negative for headaches, dizziness  BEHAVIOR/PSYCH:  negative for increased agitation and anxiety    PHYSICAL EXAM:    VITALS:  /81   Pulse 65   Temp 97.4 °F (36.3 °C)   Resp 18   Ht 5' 2\" (1.575 m)   Wt 160 lb (72.6 kg)   SpO2 95%   BMI 29.26 kg/m²   CONSTITUTIONAL:  awake, alert, cooperative, no apparent distress, and appears stated age  MUSCULOSKELETAL:  Left lower Extremity:  8 cm deep transverse laceration to the medial aspect of the left ankle. Wound does probe to bone.   Slow venous ooze noted from wound site  Obvious deformity noted to the left ankle  Tenderness to palpation about the left ankle diffusely  Compartments soft and compressible  +PF/DF/EHL  +2/4 DP & PT pulses, Brisk Cap refill, Toes warm and perfused  Distal sensation grossly intact to Peroneals, Sural, Saphenous, and tibial nrs        DATA:    CBC:   Lab Results   Component Value Date    WBC 5.9 11/21/2020    RBC 4.10 11/21/2020    HGB 12.8 11/21/2020    HCT 41.7 11/21/2020    .7 11/21/2020    MCH 31.2 11/21/2020    MCHC 30.7 11/21/2020    RDW 16.3 11/21/2020     11/21/2020    MPV 10.2 11/21/2020     PT/INR:    Lab Results   Component Value Date    PROTIME 12.9 11/21/2020    INR 1.2 11/21/2020       Radiology Review:  X-ray left ankle: Bimalleolar fracture of the left ankle. Short oblique distal fibula fracture with lateral displacement at the level of the syndesmosis. Displaced transverse fracture of the medial malleolus with displacement. The ankle is located within the mortise. IMPRESSION:  · Grade 2 open left bimalleolar fracture    PLAN:  · Patient received Ancef and tetanus in the emergency department  · Wet-to-dry dressing was applied to the medial left ankle wound  · Closed reduction of the left ankle the performed with application of well-padded 3 sided short leg splint  · N.p.o. now  · Treatment consent  · Hold anticoagulation  · Plan for left ankle I&D with ex-fix versus ORIF later this morning, 11/22  · Pain control  · Ice and elevation  · Plan discussed with Dr. Georgia Stallings        I have seen and evaluated the patient and agree with the above assessment on today's visit. I have performed the key components of the history and physical examination and concur completely with the findings and plans as documented. Agree with ROS, examination, FMH, PMH, PSH, SocHx, and allergies as above. Patient physically seen and examined. Patient with right grade 2 open bimalleolar ankle fracture. She was stepping off the toilet. Patient with very large medial soft tissue wound about 16 cm. Patient does have exposed bone. It was a low energy injury. Talked her in detail about potential limb loss. Explained there could be need for plastic surgery or other surgeries due to the soft tissue injury itself. Also specifically talked about the fracture. I explained the risks and complications of surgery with the patient including but not limited to death from anesthesia, possible neurovascular damage, possible infection, possible nonunion, possible hardware failure, possible need for further surgery, etc.  Patient understood this, asked appropriate questions and decided to go forward with the procedure.         Physical Examination: General appearance: alert, well appearing, and in no distress,  normal appearing weight. No visible signs of trauma   Mental status: alert, oriented to person, place, and time, normal mood, behavior, speech, dress, motor activity, and thought processes  Abdomen: soft, nondistended  Resp:   resp easy and unlabored, no audible wheezes note, normal symmetrical expansion of both hemithoraces  Cardiac: distal pulses palpable, skin and extremities well perfused  Neurological: alert, oriented X3, normal speech, no focal findings or movement disorder noted, motor and sensory grossly normal bilaterally, normal muscle tone, no tremors  HEENT: normochephalic atraumatic, external ears and eyes normal, sclera normal, neck supple, no nasal discharge. Extremities:   peripheral pulses normal, no edema, redness or tenderness in the calves   Skin: normal coloration, no rashes or open wounds, no suspicious skin lesions noted  Psych: Affect euthymic   Musculoskeletal:   Extremity:  Patient with large wound medially and deformity. Agree with examination above. ELECTRONICALLY signed by:    Debbie Dixon MD  11/22/20   This is been dictated utilizing voice recognition software. All efforts have been made to make the note accurate although inadvertent errors may be present.

## 2020-11-22 NOTE — PROGRESS NOTES
Patient has been evaluated for surgery: Additional encounter required to update her medication reconciliation. ASSESSMENT:          Active Hospital Problems     Diagnosis Date Noted    Subtherapeutic anticoagulation [Z51.81, Z79.01] 11/22/2020    Ankle fracture, left, open type I or II, initial encounter [S82.892B] 11/22/2020    Weakness [R53.1] 11/22/2020    Ambulatory dysfunction [R26.2] 11/22/2020    Falls frequently [R29.6] 11/22/2020    Stage 4 chronic kidney disease ([facility] [facility] [N18.4] 11/22/2020    RBBB [I45.10] 11/22/2020    COPD (chronic obstructive pulmonary disease) ([facility] [facility] [J44.9] 11/22/2020    History of chronic respiratory failure [Z87.09] 11/22/2020    Tobacco dependence [F17.200] 11/22/2020    Atrial fibrillation ([facility] [I48.91] 11/22/2020    Pre-op evaluation [Z01.818] 11/22/2020   . DM  .  Grade 2 diastolic dysfunction    [place] on the history and physical done earlier patient was determined to be an intermediate cardiac risk and cardiac consultation has been requested for clearance  Patient has stage IV kidney disease which appears to be at baseline  Medications will have to be adapted to this  History and examination repeated  Patient has COPD and acknowledges continued tobacco abuse  Also has a history of's atrial fibrillation but currently in sinus rhythm  She is on Eliquis  He also will require perioperative monitoring and control of her glycemia    The patient was interviewed and reexamined has a significant stigma of COPD  Evidence of reactive airway and bronchospasm with wet cough  We will start DuoNeb aggressively she may need possible pulmonary assistance postoperatively    Advised her medications to adapt to the renal insufficiency    This is an extensive review of her home medications after discussion with the  and clinical staff  I reviewed the patient's status preoperatively

## 2020-11-22 NOTE — ANESTHESIA PRE PROCEDURE
Department of Anesthesiology  Preprocedure Note       Name:  Adrian Lockhart   Age:  [de-identified] y.o.  :  1940                                          MRN:  [id number]         Date:  2020      Surgeon: Libby Guzmán):  Fly Lopez MD    Procedure: Procedure(s):  LEFT ANKLE IRRIGATION AND DEBRIDEMENT EXTERNAL FIXATOR VS OPEN REDUCTION INTERNAL FIXATION    Medications prior to admission:   Prior to Admission medications    Medication Sig Start Date End Date Taking?  Authorizing Provider   amLODIPine (NORVASC) 5 MG tablet Take 5 mg by mouth daily   Yes Historical Provider, MD   apixaban (ELIQUIS) 2.5 MG TABS tablet Take 2.5 mg by mouth 2 times daily   Yes Historical Provider, MD   furosemide (LASIX) 40 MG tablet Take 60 mg by mouth 2 times daily   Yes Historical Provider, MD   levothyroxine (SYNTHROID) 88 MCG tablet Take 88 mcg by mouth Daily   Yes Historical Provider, MD   lisinopril (PRINIVIL;ZESTRIL) 10 MG tablet Take 12.5 mg by mouth daily   Yes Historical Provider, MD   metoprolol tartrate (LOPRESSOR) 25 MG tablet Take 6.25 mg by mouth 2 times daily   Yes Historical Provider, MD   potassium chloride (KLOR-CON) 10 MEQ extended release tablet Take 10 mEq by mouth daily   Yes Historical Provider, MD   lovastatin (MEVACOR) 10 MG tablet Take 10 mg by mouth nightly   Yes Historical Provider, MD   allopurinol (ZYLOPRIM) 300 MG tablet Take 300 mg by mouth daily   Yes Historical Provider, MD       Current medications:    Current Facility-Administered Medications   Medication Dose Route Frequency Provider Last Rate Last Dose    morphine (PF) injection 2 mg  2 mg Intravenous Q4H PRN Bennett Osler, MD   2 mg at 20 0629    oxyCODONE-acetaminophen (PERCOCET) 5-325 MG per tablet 1 tablet  1 tablet Oral Q4H PRN Bennett Osler, MD   1 tablet at 20 0415    sodium chloride flush 0.9 % injection 10 mL  10 mL Intravenous 2 times per day Bennett Osler, MD        sodium chloride flush 0.9 % injection 10 mL  10 mL Intravenous PRN Abraham Kayser, MD   10 mL at 11/22/20 1400    acetaminophen (TYLENOL) tablet 650 mg  650 mg Oral Q6H PRN Abraham Kayser, MD        Or    acetaminophen (TYLENOL) suppository 650 mg  650 mg Rectal Q6H PRN Abraham Kayser, MD        polyethylene glycol (GLYCOLAX) packet 17 g  17 g Oral Daily PRN Abraham Kayser, MD        promethazine (PHENERGAN) tablet 12.5 mg  12.5 mg Oral Q6H PRN Abraham Kayser, MD        Or    ondansetron (ZOFRAN) injection 4 mg  4 mg Intravenous Q6H PRN Abraham Kayser, MD        insulin lispro (HUMALOG) injection vial 0-10 Units  0-10 Units Subcutaneous 4x Daily WC Abraham Kayser, MD        glucose (GLUTOSE) 40 % oral gel 15 g  15 g Oral PRN Abraham Kayser, MD        dextrose 50 % IV solution  12.5 g Intravenous PRN Abraham Kayser, MD        glucagon (rDNA) injection 1 mg  1 mg Intramuscular PRN Abraham Kayser, MD        dextrose 5 % solution  100 mL/hr Intravenous PRN Abraham Kayser, MD        ipratropium-albuterol (DUONEB) nebulizer solution 1 ampule  1 ampule Inhalation Q4H PRN Abraham Kayser, MD        metoprolol tartrate (LOPRESSOR) tablet 6.25 mg  6.25 mg Oral BID Monserrat Prabhakar MD        apixaban Carvel Kamar) tablet 2.5 mg  2.5 mg Oral BID Monserrat Prabhakar MD        amLODIPine (NORVASC) tablet 5 mg  5 mg Oral Daily Monserrat Prabhakar MD        sodium chloride flush 0.9 % injection 10 mL  10 mL Intravenous 2 times per day Monserrat Prabhakar MD        sodium chloride flush 0.9 % injection 10 mL  10 mL Intravenous PRN Monserrat Prabhakar MD        acetaminophen (TYLENOL) tablet 650 mg  650 mg Oral Q6H PRN Monserrat Prabhakar MD        Or    acetaminophen (TYLENOL) suppository 650 mg  650 mg Rectal Q6H PRN Monserrat Prabhakar MD        ipratropium-albuterol (DUONEB) nebulizer solution 1 ampule  1 ampule Inhalation Q4H WA Monserrat Prabhakar MD        glucose (GLUTOSE) 40 % oral gel 15 g  15 g Oral PRN Monserrat Prabhakar MD       [facility] dextrose 50 % IV solution  12.5 g Intravenous PRN Bobbi Napier MD        glucagon (rDNA) injection 1 mg  1 mg Intramuscular PRN Bobbi Napier MD        dextrose 5 % solution  100 mL/hr Intravenous PRN Bobbi Napier MD        insulin lispro (HUMALOG) injection vial 0-12 Units  0-12 Units Subcutaneous TID WC Bobbi Napier MD        insulin lispro (HUMALOG) injection vial 0-6 Units  0-6 Units Subcutaneous Nightly Bobbi Napier MD        [facility]) tablet 8.6 mg  1 tablet Oral Daily PRN Bobbi Napier MD         Facility-Administered Medications Ordered in Other Encounters   Medication Dose Route Frequency Provider Last Rate Last Dose    midazolam (VERSED) injection    PRN Ritika Can RN   0.5 mg at 11/22/20 0843    0.9 % sodium chloride infusion    Continuous PRN Ritika Can RN        ceFAZolin (ANCEF) injection    PRN Ritika Can RN   2,000 mg at 11/22/20 0914    rocuronium [facility]) injection    PRN Ritika Can RN   35 mg at 11/22/20 0851    lidocaine (cardiac) (XYLOCAINE) injection    PRN Ritika Can RN   80 mg at 11/22/20 0851    fentaNYL (SUBLIMAZE) injection    PRN Ritika Can RN   20 mcg at 11/22/20 0914    propofol injection    PRN Ritika Can RN   80 mg at 11/22/20 0851    dexamethasone (DECADRON) injection    PRN Ritika Can RN   10 mg at 11/22/20 0910    ondansetron (ZOFRAN) injection    PRN Ritika Can RN   4 mg at 11/22/20 0914    phenylephrine (ROB-SYNEPHRINE) 1 MG/10ML prefilled syringe    PRN Ritika Can RN   100 mcg at 11/22/20 1823       Allergies:     Allergies   Allergen Reactions    Amoxicillin     Ciprofloxacin        Problem List:    Patient Active Problem List   Diagnosis Code    Subtherapeutic anticoagulation Z51.81, Z79.01    Ankle fracture, left, open type I or II, initial encounter S82.892B    Weakness R53.1    Ambulatory dysfunction R26.2    Falls frequently R29.6    Stage 4 chronic kidney disease ([facility] N18.4    RBBB I45.10    COPD (chronic obstructive pulmonary disease) ([facility]) J44.9    History of chronic respiratory failure Z87.09    Tobacco dependence F17.200    Atrial fibrillation ([facility]) I48.91    Pre-op evaluation Z01.818    Type 2 diabetes mellitus ([facility] E11.9       Past Medical History:  History reviewed. No pertinent past medical history. Past Surgical History:  History reviewed. No pertinent surgical history. Social History:    Social History     Tobacco Use    Smoking status: Current Every Day Smoker    Smokeless tobacco: Never Used   Substance Use Topics    Alcohol use: Yes                                Ready to quit: Not Answered  Counseling given: Not Answered      Vital Signs (Current):   Vitals:    11/22/20 0247 11/22/20 0302 11/22/20 0405 11/22/20 0745   BP: 127/65 131/68 127/60 (!) 110/52   Pulse:   89 79   Resp:   16 16   Temp:   36.4 °C (97.6 °F) 36.1 °C (97 °F)   TempSrc:   Temporal Temporal   SpO2: 93% 94%  97%   Weight:       Height:                                                  BP Readings from Last 3 Encounters:   11/22/20 (!) 110/52       NPO Status: Time of last liquid consumption: 2300                        Time of last solid consumption: 2330                        Date of last liquid consumption: 11/21/20                        Date of last solid food consumption: 11/21/20    BMI:   Wt Readings from Last 3 Encounters:   11/21/20 160 lb (72.6 kg)     Body mass index is 29.26 kg/m².     CBC:   Lab Results   Component Value Date    WBC 8.7 11/22/2020    RBC 4.04 11/22/2020    HGB 12.9 11/22/2020    HCT 41.1 11/22/2020    .7 11/22/2020    RDW 16.5 11/22/2020     11/22/2020       CMP:   Lab Results   Component Value Date     11/22/2020    K 4.3 11/22/2020     11/22/2020    CO2 25 11/22/2020    BUN 35 11/22/2020    CREATININE 2.3 11/22/2020    GFRAA 25 11/22/2020    LABGLOM 20 11/22/2020    GLUCOSE 84 11/22/2020    PROT 5.7 11/21/2020 CALCIUM 9.4 11/22/2020    BILITOT 0.6 11/21/2020    ALKPHOS 101 11/21/2020    AST 11 11/21/2020    ALT 6 11/21/2020       POC Tests: No results for input(s): POCGLU, POCNA, POCK, POCCL, POCBUN, POCHEMO, POCHCT in the last 72 hours. Coags:   Lab Results   Component Value Date    PROTIME 12.9 11/21/2020    INR 1.2 11/21/2020    APTT 27.4 11/22/2020       HCG (If Applicable): No results found for: PREGTESTUR, PREGSERUM, HCG, HCGQUANT     ABGs: No results found for: PHART, PO2ART, NBJ1ZRD, ZYK3ESB, BEART, G5XWTRKT     Type & Screen (If Applicable):  No results found for: LABABO, LABRH    Drug/Infectious Status (If Applicable):  No results found for: HIV, HEPCAB    COVID-19 Screening (If Applicable): No results found for: COVID19    EKG 11/21/2020  Narrative & Impression     Sinus rhythm with occasional premature ventricular complexes  Right bundle branch block  Abnormal ECG     ECHO  Documented echo in 2012 showed EF of 65 to 70% with grade 1 diastolic dysfunction, severe pulmonary hypertension mild TR. Stress test in 2012 was negative.     Anesthesia Evaluation  Patient summary reviewed and Nursing notes reviewed no history of anesthetic complications:   Airway: Mallampati: II  TM distance: >3 FB   Neck ROM: full  Mouth opening: > = 3 FB Dental:          Pulmonary: breath sounds clear to auscultation  (+) COPD:  current smoker                          ROS comment: 2L NC at all times  History of chronic respiratory failure    Cardiovascular:    (+) hypertension:, dysrhythmias: atrial fibrillation,       ECG reviewed  Rhythm: regular    Echocardiogram reviewed  Stress test reviewed       Beta Blocker:  Dose within 24 Hrs      ROS comment: RBBB     Neuro/Psych:                ROS comment: Frequent falls  Ankle fracture  Weakness   Ambulatory dysfunction     Alcohol use unspecified volume GI/Hepatic/Renal:   (+) renal disease (stage 4): ESRD,           Endo/Other:    (+) DiabetesType II DM, , blood dyscrasia (subtheraputic): anticoagulation therapy:., .                 Abdominal:   (+) obese,         Vascular:   + DVT, . Anesthesia Plan      general     ASA 4 - emergent     (PIV #18 R AC)  Induction: intravenous. arterial line  MIPS: Postoperative opioids intended and Prophylactic antiemetics administered. Anesthetic plan and risks discussed with patient. Use of blood products discussed with patient whom consented to blood products. Plan discussed with CRNA and attending. Savanna Ellsworth RN   11/22/2020      Agree with above assessment. Physical exam unchanged. Spoke to patient about anesthetic plan.   Patient understands and wishes to proceed.  (this addendum was done preop but unable to be filed electronically at that time)

## 2020-11-22 NOTE — ED NOTES
Bed: 23  Expected date:   Expected time:   Means of arrival:   Comments:  Joshua Reece, LADI  11/21/20 6281

## 2020-11-22 NOTE — OP NOTE
Operative Note      Patient: Michell Pritchett  YOB: 1940  MRN: [id number]    Date of Procedure: 11/22/2020    Pre-Op Diagnosis:   1. Grade 2 open left ankle fracture, bimalleolar with 15 cm wound medially    Post-Op Diagnosis: Same, with addition of unstable syndesmosis           Seizure:  1. Irrigation and debridement left grade 2 open ankle fracture including skin, subcutaneous tissue, muscle, and bone  2. Open reduction internal fixation left bimalleolar ankle fracture  3. Open reduction internal fixation left ankle syndesmosis    Surgeon(s):  Brianda Hua MD    Assistant:   Resident: Juan Jean DO; Shelli Josue DO    Anesthesia: General    Estimated Blood Loss (mL): less than 345     Complications: None    Specimens:   * No specimens in log *    Implants:  Implant Name Type Inv.  Item Serial No.  Lot No. LRB No. Used Action   PLATE BNE [id number] 10 H S STL 1/3 TBLR RA COMPR W/ CLLR FOR - [id number]  PLATE BNE [id number] 10 H S STL 1/3 TBLR RA COMPR W/ CLLR .401 [employer] [place]  Left 1 Implanted   SCREW BNE L16MM DIA3.5MM SHYLA S STL ST RA FULL THRD - [id number]  SCREW BNE L16MM DIA3.5MM SHYLA S STL ST RA FULL THRD 212.104 [employer] [place]  Left 2 Implanted   SCREW BNE L12MM DIA3.5MM SHYLA S STL ST NONCANNULATED RA - [id number]  SCREW BNE L12MM DIA3.5MM SHYLA S STL ST NONCANNULATED .812 [employer] [place]  Left 3 Implanted   SCREW BNE L46MM DIA3.5MM SHYAL S STL ST NONCANNULATED RA - [id number]  SCREW BNE L46MM DIA3.5MM SHYLA S STL ST NONCANNULATED .846 [employer] [place]  Left 1 Implanted   SCREW BNE L50MM DIA3.5MM SHYLA S STL ST NONCANNULATED RA - [id number]  SCREW BNE L50MM DIA3.5MM SHYLA S STL ST NONCANNULATED .850 [employer]  Left 1 Implanted   SCREW BNE L46MM DIA4MM S STL LOYD SHT 1/3 THRD SM HEX SOCK - [id number]  SCREW BNE L46MM DIA4MM S STL LOYD SHT 1/3 THRD SM HEX SOCK 207.646 [employer] [place]  Left 2 Implanted   WASHER ORTH DIA7MM FOR LOYD SCR - [id number]  WASHER ORTH DIA7MM FOR LOYD .98 [employer] [place]  Left 2 Implanted         Drains: * No LDAs found *    Findings: Stable syndesmosis and large wound medially    Detailed Description of Procedure:   Patient was brought to the operating room in a supine position on a hospital bed. Patient was transferred to the operating room table by multiple individuals in a safe fashion with anesthesia in control of the patient's C-spine and airway. Once on the operating table, all points of pressure were identified and well-padded. A tourniquet was applied to the patient's left upper thigh. The patient's left lower extremity was sterilely prepped and draped in the standard orthopedic fashion. A timeout was performed indicating the appropriate identification of the patient, the procedure to be performed, and the side to be performed upon. This was agreed upon by all individuals in the room. At this point time the 15 cm wound on the medial side the ankle was inspected. The skin edges, subcutaneous fat, some muscle and fascia, and a small piece of bone not connected to soft tissue were excisionally debrided. Vila and curettes were also utilized to debride the bone edges. The ankle joint was visualized. The fracture fragments were delivered and it was copiously irrigated with sterile normal saline through a low pressure system. After copious irrigation the medial malleolus fracture was near anatomically reduced and held with 2 K wires. Attention was then turned laterally where the subcutaneous approach to the fibula was utilized. After marking out the incision, a 10 places make incision. Dissection was utilized to identify and protect the superficial peroneal nerve. Subperiosteal dissection was carried around the fracture site. The fracture site is cleaned out with Vila curettes and irrigation. This is reduced anatomically.   There is no good position of the lag screw nor was the patient's bone quality conducive to a lag screw. A 10 hole one third tubular plate was then put in position. The fibula was checked under fluoroscopy was out the length. Bicortical screws were placed proximally and distally 2 locking screws were placed to lock the fibula out at length. 2 syndesmotic screws were placed after realizing the syndesmosis was not stable to the soft tissue damage. Once these were in position fluoroscopic views confirmed near anatomic reduction with hardware in good position. Attention was turned back medially where we adjusted the K wires and improve the reduction. Two 46 mm 4.0 mm cannulated screws were then placed for medial malleolus screws after being checked on AP and lateral views. Again the bone quality is very poor although this held the fracture fragment near-anatomic. Final x-rays were taken. This point time the medial wound was closed with 3-0 nylon suture in a tension-free fashion. Laterally it was becky irrigated the surgical wound. 0 Vicryl was used to close the fascia and 2-0 Monocryl was used to close the subcutaneous tissue. 3-0 nylon was used to close the skin. Occlusive dressing was put in position along with a well-padded 3 sided splint. Patient was taken the PACU in stable condition. Postoperative plan:  1. Strict nonweightbearing left lower extremity  2. DVT prophylaxis as Lovenox in the hospital and aspirin home-going  3. Follow-up in the office in 2 weeks for wound check and x-rays of the left ankle  4.   Continue to monitor for occult injury      Electronically signed by Alfie Bruce MD on 11/22/2020 at 10:33 AM

## 2020-11-22 NOTE — ED PROVIDER NOTES
HPI:  11/21/20, Time: 11:16 PM HELENA Maya is a [de-identified] y.o. female presenting to the ED for left ankle injury, beginning short time ago. The complaint has been persistent, moderate in severity, and worsened by movement of left ankle. Patient presented because of left ankle injury. Patient reports she was weak sitting on toilet and her  attempted to lift her up off the toilet and she fell. Patient denying striking her head she reports no neck pain or back pain she reports no chest pain or abdominal pain she is on Eliquis for history of DVT. Patient complaining of left ankle pain only. Patient has noted laceration to medial aspect of ankle. Patient able to move the toes. ROS:   Pertinent positives and negatives are stated within HPI, all other systems reviewed and are negative.  --------------------------------------------- PAST HISTORY ---------------------------------------------  Past Medical History:  has no past medical history on file. Past Surgical History:  has no past surgical history on file. Social History:  reports that she has been smoking. She has never used smokeless tobacco. She reports current alcohol use. She reports that she does not use drugs. Family History: family history is not on file. The patients home medications have been reviewed. Allergies: Amoxicillin and Ciprofloxacin    ---------------------------------------------------PHYSICAL EXAM--------------------------------------    Constitutional/General: Alert and oriented x3, mild distress  Head: Normocephalic and atraumatic  Eyes: PERRL, EOMI  Mouth: Oropharynx clear, handling secretions, no trismus  Neck: Supple, full ROM, non tender to palpation in the midline, no stridor, no crepitus, no meningeal signs  Pulmonary: Lungs clear to auscultation bilaterally, no wheezes, rales, or rhonchi. Not in respiratory distress  Cardiovascular:  Regular rate. Regular rhythm. No murmurs, gallops, or rubs.  2+ distal pulses  Chest: no chest wall tenderness  Abdomen: Soft. Non tender. Non distended. +BS. No rebound, guarding, or rigidity. No pulsatile masses appreciated. Musculoskeletal: Moves all extremities set for left ankle noted laceration medial aspect of left ankle about 12 cm pulses are intact by Doppler. The left ankle is rotated outward warm and well perfused, no clubbing, cyanosis, or edema. Capillary refill <3 seconds  Skin: warm and dry. No rashes. Neurologic: GCS 15, CN 2-12 grossly intact, no focal deficits, symmetric strength 5/5 in the upper   Psych: Normal Affect    -------------------------------------------------- RESULTS -------------------------------------------------  I have personally reviewed all laboratory and imaging results for this patient. Results are listed below.      LABS:  Results for orders placed or performed during the hospital encounter of 11/21/20   CBC auto differential   Result Value Ref Range    WBC 5.9 4.5 - 11.5 E9/L    RBC 4.10 3.50 - 5.50 E12/L    Hemoglobin 12.8 11.5 - 15.5 g/dL    Hematocrit 41.7 34.0 - 48.0 %    .7 (H) 80.0 - 99.9 fL    MCH 31.2 26.0 - 35.0 pg    MCHC 30.7 (L) 32.0 - 34.5 %    RDW 16.3 (H) 11.5 - 15.0 fL    Platelets 293 214 - 817 E9/L    MPV 10.2 7.0 - 12.0 fL    Neutrophils % 72.1 43.0 - 80.0 %    Immature Granulocytes % 0.5 0.0 - 5.0 %    Lymphocytes % 14.1 (L) 20.0 - 42.0 %    Monocytes % 10.7 2.0 - 12.0 %    Eosinophils % 1.7 0.0 - 6.0 %    Basophils % 0.9 0.0 - 2.0 %    Neutrophils Absolute 4.23 1.80 - 7.30 E9/L    Immature Granulocytes # 0.03 E9/L    Lymphocytes Absolute 0.83 (L) 1.50 - 4.00 E9/L    Monocytes Absolute 0.63 0.10 - 0.95 E9/L    Eosinophils Absolute 0.10 0.05 - 0.50 E9/L    Basophils Absolute 0.05 0.00 - 0.20 E9/L   Comprehensive Metabolic Panel   Result Value Ref Range    Sodium 143 132 - 146 mmol/L    Potassium 4.5 3.5 - 5.0 mmol/L    Chloride 106 98 - 107 mmol/L    CO2 31 (H) 22 - 29 mmol/L    Anion Gap 6 (L) 7 - 16 mmol/L    Glucose 99 74 - 99 mg/dL    BUN 35 (H) 8 - 23 mg/dL    CREATININE 2.4 (H) 0.5 - 1.0 mg/dL    GFR Non-African American 19 >=60 mL/min/1.73    GFR African American 23     Calcium 9.9 8.6 - 10.2 mg/dL    Total Protein 5.7 (L) 6.4 - 8.3 g/dL    Alb 3.1 (L) 3.5 - 5.2 g/dL    Total Bilirubin 0.6 0.0 - 1.2 mg/dL    Alkaline Phosphatase 101 35 - 104 U/L    ALT 6 0 - 32 U/L    AST 11 0 - 31 U/L   Troponin   Result Value Ref Range    Troponin 0.02 0.00 - 0.03 ng/mL   Protime-INR   Result Value Ref Range    Protime 12.9 (H) 9.3 - 12.4 sec    INR 1.2    EKG 12 Lead   Result Value Ref Range    Ventricular Rate 64 BPM    Atrial Rate 64 BPM    P-R Interval 198 ms    QRS Duration 142 ms    Q-T Interval 442 ms    QTc Calculation (Bazett) 455 ms    P Axis 72 degrees    R Axis 25 degrees    T Axis -21 degrees       RADIOLOGY:  Interpreted by Radiologist.  XR CHEST PORTABLE    (Results Pending)   XR ANKLE LEFT (MIN 3 VIEWS)    (Results Pending)   XR ANKLE LEFT (2 VIEWS)    (Results Pending)       EKG: This EKG is signed and interpreted by me. Rate: 64  Rhythm: Sinus  Interpretation: non-specific EKG, right bundle branch block occasional PVCs  Comparison: no previous EKG available        ------------------------- NURSING NOTES AND VITALS REVIEWED ---------------------------   The nursing notes within the ED encounter and vital signs as below have been reviewed by myself. /69   Pulse 65   Temp 97.4 °F (36.3 °C)   Resp 18   Ht 5' 2\" (1.575 m)   Wt 160 lb (72.6 kg)   SpO2 98%   BMI 29.26 kg/m²   Oxygen Saturation Interpretation: Normal    The patients available past medical records and past encounters were reviewed.         ------------------------------ ED COURSE/MEDICAL DECISION MAKING----------------------  Medications   Tetanus-Diphth-Acell Pertussis (BOOSTRIX) injection 0.5 mL (0.5 mLs Intramuscular Given 11/22/20 0019)   ceFAZolin (ANCEF) 1 g in sterile water 10 mL IV syringe (1 g Intravenous Given 11/22/20 0007)   fentaNYL (SUBLIMAZE) injection 25 mcg (25 mcg Intravenous Given 11/22/20 0027)             Medical Decision Making:    Patient presenting because of weakness. She was on toilet and per report  was attempting to get her off the toilet and she fell. Patient reporting she did not strike her head. She reports ankle pain. We were called by EMS and were told that patient had no pulses. On arrival.  Patient has large laceration to medial aspect of her ankle. Doppler pulses were obtained she had good posterior tibial as well as dorsalis pedis pulse. Patient was splinted by orthopedics here. Patient to go to the OR this morning. Plan will be to admit to medicine  please I did review x-rays of ankle as well as chest.  Awaiting for official report from radiology    Re-Evaluations:             Re-evaluation. Patients symptoms show no change  Patient reevaluated and vitals are stable. Patient is in no distress. Patient reporting no chest pain no headache. Patient made aware of findings orthopedics evaluate patient and splinted patient. Patient to go to the OR this morning. Consultations:             Ortho consulted  Internal medicine  Critical Care: This patient's ED course included: a personal history and physicial eaxmination    This patient has been closely monitored during their ED course. Counseling: The emergency provider has spoken with the patient and discussed todays results, in addition to providing specific details for the plan of care and counseling regarding the diagnosis and prognosis. Questions are answered at this time and they are agreeable with the plan.       --------------------------------- IMPRESSION AND DISPOSITION ---------------------------------    IMPRESSION  1. Type I or II open fracture of left ankle, initial encounter    2. General weakness    3.  Renal insufficiency        DISPOSITION  Disposition: Admit to telemetry  Patient condition is stable        NOTE: This report was transcribed using voice recognition software.  Every effort was made to ensure accuracy; however, inadvertent computerized transcription errors may be present          Jan Johnson MD  11/22/20 9428

## 2020-11-22 NOTE — PLAN OF CARE
Problem: Skin Integrity:  Goal: Absence of new skin breakdown  Description: Absence of new skin breakdown  11/22/2020 0556 by Triny Mccoy RN  Outcome: Ongoing

## 2020-11-22 NOTE — PROGRESS NOTES
1028 admit to PACU  comnnected to monitors. Arterial line to left radial artery. Fingers warm  Non blanching. Dusky able to move fingers. Denies decrease sensation. ace wrap removed from arterial line site. colorimproved  Capillary refill now approx 5 seconds. Arterial line dressing saturated with blood. 1045 arterial line removed  Direct pressure held    Area ecchymotic. Small amount of soft tissue swelling proximal to insertion site . Radial pulse strong. 1100  Dr. Nabil Morris here to evaluate. 1105 x-rays done    Family not available in waiting area.

## 2020-11-22 NOTE — ANESTHESIA PROCEDURE NOTES
Arterial Line:    An arterial line was placed using surface landmarks, in the OR for the following indication(s): continuous blood pressure monitoring. A 20 gauge (size), 1 and 3/4 inch (length), Arrow (type) catheter was placed, Seldinger technique not used, into the left radial artery, secured by tape and Tegaderm. Anesthesia type: Local  Local infiltration: Injection    Events:  hematoma during insertion and patient tolerated procedure well with no complications. 2020 8:30 AM2020 8:47 AM  Anesthesiologist: Dalton Parry MD  Resident/CRNA: KIM Child CRNA  Other anesthesia staff: Shanna Han RN  Performed:  Other anesthesia staff   Preanesthetic Checklist  Completed: patient identified, site marked, surgical consent, pre-op evaluation, timeout performed, IV checked, risks and benefits discussed, monitors and equipment checked, anesthesia consent given, oxygen available and patient being monitored negative

## 2020-11-23 LAB
ANION GAP SERPL CALCULATED.3IONS-SCNC: 11 MMOL/L (ref 7–16)
BUN BLDV-MCNC: 40 MG/DL (ref 8–23)
CALCIUM SERPL-MCNC: 9.2 MG/DL (ref 8.6–10.2)
CHLORIDE BLD-SCNC: 105 MMOL/L (ref 98–107)
CO2: 23 MMOL/L (ref 22–29)
CREAT SERPL-MCNC: 2.5 MG/DL (ref 0.5–1)
GFR AFRICAN AMERICAN: 22
GFR NON-AFRICAN AMERICAN: 19 ML/MIN/1.73
GLUCOSE BLD-MCNC: 111 MG/DL (ref 74–99)
HCT VFR BLD CALC: 35.9 % (ref 34–48)
HEMOGLOBIN: 11.2 G/DL (ref 11.5–15.5)
MCH RBC QN AUTO: 32.2 PG (ref 26–35)
MCHC RBC AUTO-ENTMCNC: 31.2 % (ref 32–34.5)
MCV RBC AUTO: 103.2 FL (ref 80–99.9)
METER GLUCOSE: 114 MG/DL (ref 74–99)
METER GLUCOSE: 148 MG/DL (ref 74–99)
METER GLUCOSE: 164 MG/DL (ref 74–99)
METER GLUCOSE: 167 MG/DL (ref 74–99)
PDW BLD-RTO: 16.6 FL (ref 11.5–15)
PLATELET # BLD: 132 E9/L (ref 130–450)
PMV BLD AUTO: 10.8 FL (ref 7–12)
POTASSIUM REFLEX MAGNESIUM: 5 MMOL/L (ref 3.5–5)
RBC # BLD: 3.48 E12/L (ref 3.5–5.5)
SARS-COV-2, NAAT: NOT DETECTED
SODIUM BLD-SCNC: 139 MMOL/L (ref 132–146)
WBC # BLD: 10.3 E9/L (ref 4.5–11.5)

## 2020-11-23 PROCEDURE — 97535 SELF CARE MNGMENT TRAINING: CPT

## 2020-11-23 PROCEDURE — 97530 THERAPEUTIC ACTIVITIES: CPT

## 2020-11-23 PROCEDURE — 80048 BASIC METABOLIC PNL TOTAL CA: CPT

## 2020-11-23 PROCEDURE — 94640 AIRWAY INHALATION TREATMENT: CPT

## 2020-11-23 PROCEDURE — 2700000000 HC OXYGEN THERAPY PER DAY

## 2020-11-23 PROCEDURE — 85027 COMPLETE CBC AUTOMATED: CPT

## 2020-11-23 PROCEDURE — 36415 COLL VENOUS BLD VENIPUNCTURE: CPT

## 2020-11-23 PROCEDURE — 94760 N-INVAS EAR/PLS OXIMETRY 1: CPT

## 2020-11-23 PROCEDURE — 6370000000 HC RX 637 (ALT 250 FOR IP): Performed by: STUDENT IN AN ORGANIZED HEALTH CARE EDUCATION/TRAINING PROGRAM

## 2020-11-23 PROCEDURE — 2580000003 HC RX 258: Performed by: STUDENT IN AN ORGANIZED HEALTH CARE EDUCATION/TRAINING PROGRAM

## 2020-11-23 PROCEDURE — 6360000002 HC RX W HCPCS: Performed by: STUDENT IN AN ORGANIZED HEALTH CARE EDUCATION/TRAINING PROGRAM

## 2020-11-23 PROCEDURE — 97166 OT EVAL MOD COMPLEX 45 MIN: CPT

## 2020-11-23 PROCEDURE — 1200000000 HC SEMI PRIVATE

## 2020-11-23 PROCEDURE — U0002 COVID-19 LAB TEST NON-CDC: HCPCS

## 2020-11-23 PROCEDURE — 82962 GLUCOSE BLOOD TEST: CPT

## 2020-11-23 PROCEDURE — 51798 US URINE CAPACITY MEASURE: CPT

## 2020-11-23 RX ADMIN — INSULIN LISPRO 2 UNITS: 100 INJECTION, SOLUTION INTRAVENOUS; SUBCUTANEOUS at 17:50

## 2020-11-23 RX ADMIN — IPRATROPIUM BROMIDE AND ALBUTEROL SULFATE 1 AMPULE: .5; 3 SOLUTION RESPIRATORY (INHALATION) at 08:50

## 2020-11-23 RX ADMIN — INSULIN LISPRO 2 UNITS: 100 INJECTION, SOLUTION INTRAVENOUS; SUBCUTANEOUS at 09:28

## 2020-11-23 RX ADMIN — SODIUM CHLORIDE, PRESERVATIVE FREE 10 ML: 5 INJECTION INTRAVENOUS at 17:48

## 2020-11-23 RX ADMIN — Medication 10 ML: at 20:23

## 2020-11-23 RX ADMIN — AMLODIPINE BESYLATE 5 MG: 5 TABLET ORAL at 09:27

## 2020-11-23 RX ADMIN — APIXABAN 2.5 MG: 2.5 TABLET, FILM COATED ORAL at 09:27

## 2020-11-23 RX ADMIN — IPRATROPIUM BROMIDE AND ALBUTEROL SULFATE 1 AMPULE: .5; 3 SOLUTION RESPIRATORY (INHALATION) at 17:15

## 2020-11-23 RX ADMIN — IPRATROPIUM BROMIDE AND ALBUTEROL SULFATE 1 AMPULE: .5; 3 SOLUTION RESPIRATORY (INHALATION) at 13:02

## 2020-11-23 RX ADMIN — LEVOTHYROXINE SODIUM 88 MCG: 0.09 TABLET ORAL at 06:15

## 2020-11-23 RX ADMIN — Medication 2 G: at 09:26

## 2020-11-23 RX ADMIN — INSULIN LISPRO 1 UNITS: 100 INJECTION, SOLUTION INTRAVENOUS; SUBCUTANEOUS at 20:34

## 2020-11-23 RX ADMIN — APIXABAN 2.5 MG: 2.5 TABLET, FILM COATED ORAL at 20:22

## 2020-11-23 RX ADMIN — METOPROLOL TARTRATE 6.25 MG: 25 TABLET, FILM COATED ORAL at 09:26

## 2020-11-23 RX ADMIN — IPRATROPIUM BROMIDE AND ALBUTEROL SULFATE 1 AMPULE: .5; 3 SOLUTION RESPIRATORY (INHALATION) at 22:16

## 2020-11-23 RX ADMIN — Medication 2 G: at 17:49

## 2020-11-23 RX ADMIN — Medication 10 ML: at 09:27

## 2020-11-23 RX ADMIN — Medication 2 G: at 01:09

## 2020-11-23 NOTE — PROGRESS NOTES
Department of Orthopedic Surgery  Resident Progress Note    Patient seen and examined. Pain controlled. No new complaints. Denies chest pain, shortness of breath, calf pain, dizziness/lightheadedness. VITALS:  /60   Pulse 76   Temp 98.3 °F (36.8 °C) (Temporal)   Resp 16   Ht 5' 2\" (1.575 m)   Wt 160 lb (72.6 kg)   SpO2 95%   BMI 29.26 kg/m²     GENERAL: alert and oriented  MUSCULOSKELETAL:   left lower extremity:  · Splint C/D/I  · Compartments soft and compressible, calf non-tender  · Palpable dorsalis pedis pulse, brisk cap refill to toes, foot warm and perfused  · Sensation intact to light touch distally to foot and all toes  · Demonstrates active toe extension/flexion    CBC:   Lab Results   Component Value Date    WBC 10.3 11/23/2020    HGB 11.2 11/23/2020    HCT 35.9 11/23/2020     11/23/2020       ASSESSMENT  · S/p L ankle ORIF 11/22    PLAN    NWB  Pain control IV & PO  DVT prophylaxis- lovenox, early mobilization  Monitor Labs  Bowel regiment  Pulmonary hygiene   Trend H&H- 11.2  24 hr post op ABX   PT/OT  D/C planning, SW/PT recs  Discuss with attending      Patient seen and examined. Patient having really no pain whatsoever. She will most likely need placement due to bilateral rotator cuff issues as well as her nonweightbearing left lower extremity. She is wiggling toes and has good capillary refill in the left lower extremity. She will need placement, she is okay to DC per orthopedics when she receives 24 hours antibiotics after last surgery.

## 2020-11-23 NOTE — PROGRESS NOTES
Messaged Dr. Fernando Feldman regarding patient's complaint of leg pain and discomfort from cast being too tight.

## 2020-11-23 NOTE — CARE COORDINATION
11/23, SW was informed by Cookie Lott that facility is not accepting anyone today and may not until middle of week. [facility] in [place] was called at [phone]  left for admissions on referral-awaiting call back. SW to follow for further discharge planning.     Galen Samuels, [facility]

## 2020-11-23 NOTE — PROGRESS NOTES
Standing:  NT  Sitting EOB:  Independent  Dynamic Standing:  Reji front [employer] with NWB LLE     Pt is A & O x 4    Patient education Pt educated on importance of participating in therapy, moving her good leg and ue in bed within tolerance. Pt educated on weight bearing restrictions. Patient response to education:   Pt verbalized understanding Pt demonstrated skill Pt requires further education in this area   yes yes yes     ASSESSMENT:    Comments:    Pt  In bed upon arrival.   Pt educated on log role and she performed with mod assist.   Pt not able to do much due to B shoulder injuries and weakness. Pt sat eob approx 15 min and pt co pain top of cast below her knee. Nursing called and they accessed. Pt declined standing. Had pt working on lateral scooting toward her right with max assist and using care pads to assist.  Pt sat eob and educated her on sitting safely on eob using right le for stability. Pt returned to bed with mod assist and pt dependent to scoot up in bed. Pt demonstrated quad sets, glut sets and encouraged to move her right good leg. Left leg elevated on 2 pillows  And pt resting with calllight and all needs met. Treatment:  Patient practiced and was instructed in the following treatment:     Therapeutic Activities Completed:  o Functional mobility as noted above:   - Bed mobility:  Mod assist with Increased time and effort to complete. Pt assisted with LLE. Cues for efficient use of BUE on bed rail for more independent completion of task. - Pt sat at EOB x 15 minutes working on scooting    o Skilled repositioning in supine with HOB elevated. With le elevated . o Pt education as noted above. Pt's/ family goals   1. Return home. Patient and or family understand(s) diagnosis, prognosis, and plan of care.   yes    PLAN OF CARE:    Current Treatment Recommendations     [x] Strengthening     [x] ROM   [x] Balance Training   [x] Endurance Training   [x] Transfer Training   [x] Gait Training   [x] Stair Training   [x] Positioning   [x] Safety and Education Training   [x] Patient/Caregiver Education   [] HEP  [] Other     PT care will be provided in accordance with the objectives noted above. The above treatment recommendations will be utilized to address deficits described above in order to restore pt's prior level of function and/or achieve modified functional independence with adaptive strategies. Frequency of treatments: 2-5x/week x 1-2 weeks. Time in  126  Time out  200    Total Treatment Time  34 minutes     and goals.     CPT codes:  [] Low Complexity PT evaluation 92385  [] Moderate Complexity PT evaluation 50725  [] High Complexity PT evaluation 22467  [] PT Re-evaluation 02914  [] Gait training 42581 0 minutes  [] Manual therapy 58510 0 minutes  [x] Therapeutic activities 04521 34 minutes  [] Therapeutic exercises 20621 0 minutes  [] Neuromuscular reeducation 06290 0 minutes     Cirilo Show, PTA  [id number]

## 2020-11-23 NOTE — PROGRESS NOTES
Covering sound      Subjective:    Chief complaint:    Awake  Denies new complaints    Objective:    BP (!) 107/54   Pulse 89   Temp 98.7 °F (37.1 °C) (Temporal)   Resp 15   Ht 5' 2\" (1.575 m)   Wt 160 lb (72.6 kg)   SpO2 95%   BMI 29.26 kg/m²   General : Awake ,alert,no distress. Heart:  RRR, no murmurs, gallops, or rubs. Lungs:  CTA bilaterally, no wheeze, rales or rhonchi  Abd: bowel sounds present, nontender, nondistended, no masses  Extrem:  No clubbing, cyanosis, or edema    CBC:   Lab Results   Component Value Date    WBC 10.3 11/23/2020    RBC 3.48 11/23/2020    HGB 11.2 11/23/2020    HCT 35.9 11/23/2020    .2 11/23/2020    MCH 32.2 11/23/2020    MCHC 31.2 11/23/2020    RDW 16.6 11/23/2020     11/23/2020    MPV 10.8 11/23/2020     BMP:    Lab Results   Component Value Date     11/23/2020    K 5.0 11/23/2020     11/23/2020    CO2 23 11/23/2020    BUN 40 11/23/2020    LABALBU 3.1 11/21/2020    CREATININE 2.5 11/23/2020    CALCIUM 9.2 11/23/2020    GFRAA 22 11/23/2020    LABGLOM 19 11/23/2020    GLUCOSE 111 11/23/2020     PT/INR:    Lab Results   Component Value Date    PROTIME 12.9 11/21/2020    INR 1.2 11/21/2020     Troponin:    Lab Results   Component Value Date    TROPONINI 0.02 11/21/2020       No results for input(s): LABURIN in the last 72 hours. No results for input(s): BC in the last 72 hours. No results for input(s): [place] in the last 72 hours.       Current Facility-Administered Medications:     sodium chloride flush 0.9 % injection 10 mL, 10 mL, Intravenous, 2 times per day, Jun Moreno DO, 10 mL at 11/23/20 1110    polyethylene glycol (GLYCOLAX) packet 17 g, 17 g, Oral, Daily PRN, Gayland Conradi, DO    promethazine (PHENERGAN) tablet 12.5 mg, 12.5 mg, Oral, Q6H PRN **OR** ondansetron (ZOFRAN) injection 4 mg, 4 mg, Intravenous, Q6H PRN, Jun Moreno DO    ipratropium-albuterol (DUONEB) nebulizer solution 1 ampule, 1 ampule, Inhalation, Q4H PRN, Sharona Mar, DO    metoprolol tartrate (LOPRESSOR) tablet 6.25 mg, 6.25 mg, Oral, BID, Sharona Mar, DO, 6.25 mg at 11/23/20 3969    apixaban (ELIQUIS) tablet 2.5 mg, 2.5 mg, Oral, BID, Sharona Mar, DO, 2.5 mg at 11/23/20 7865    amLODIPine (NORVASC) tablet 5 mg, 5 mg, Oral, Daily, Sharona Mar, DO, 5 mg at 11/23/20 3766    ipratropium-albuterol (DUONEB) nebulizer solution 1 ampule, 1 ampule, Inhalation, Q4H WA, Sharona Mar, DO, 1 ampule at 11/23/20 1302    glucose (GLUTOSE) 40 % oral gel 15 g, 15 g, Oral, PRN, Sharona Mar, DO    dextrose 50 % IV solution, 12.5 g, Intravenous, PRN, Sharona Mar, DO    glucagon (rDNA) injection 1 mg, 1 mg, Intramuscular, PRN, Sharona Mar, DO    dextrose 5 % solution, 100 mL/hr, Intravenous, PRN, Sharona Mar, DO    insulin lispro (HUMALOG) injection vial 0-12 Units, 0-12 Units, Subcutaneous, TID WC, Sharona Mar, DO, 2 Units at 11/23/20 2925    insulin lispro (HUMALOG) injection vial 0-6 Units, 0-6 Units, Subcutaneous, Nightly, Sharona Mar, DO, 1 Units at 11/22/20 2029    senna (SENOKOT) tablet 8.6 mg, 1 tablet, Oral, Daily PRN, Sharona Mar, DO    levothyroxine (SYNTHROID) tablet 88 mcg, 88 mcg, Oral, Daily, Sharona Mar, DO, 88 mcg at 11/23/20 0615    sodium chloride flush 0.9 % injection 10 mL, 10 mL, Intravenous, PRN, Sharona Mra, DO, 10 mL at 11/22/20 1736    morphine (PF) injection 2 mg, 2 mg, Intravenous, Q2H PRN **OR** morphine sulfate (PF) injection 4 mg, 4 mg, Intravenous, Q2H PRN, Sharona Mar, DO    ceFAZolin (ANCEF) 2 g in sterile water 20 mL IV syringe, 2 g, Intravenous, Q8H, Sharona Mar, DO, 2 g at 11/23/20 0926    benzocaine-menthol (CEPACOL SORE THROAT) lozenge 1 lozenge, 1 lozenge, Oral, Q2H PRN, Sharona Mar, DO, 1 lozenge at 11/22/20 1646    traMADol (ULTRAM) tablet 50 mg, 50 mg, Oral, Q6H PRN **OR** traMADol (ULTRAM) tablet 100 mg, 100 mg, Oral, Q6H PRN, Benjiman Jean, DO, 100 mg at 11/22/20 1641    acetaminophen (TYLENOL) tablet 650 mg, 650 mg, Oral, Q4H PRN **OR** acetaminophen (TYLENOL) suppository 650 mg, 650 mg, Rectal, Q4H PRN, Benjiman Jean, DO    DIET GENERAL;    XR ANKLE LEFT (MIN 3 VIEWS)   Final Result   After ORIF and casting for fractures of the distal left tibia and fibula. FLUORO FOR SURGICAL PROCEDURES   Final Result   Intraprocedural fluoroscopic spot images as above. See separate procedure   report for more information. XR CHEST PORTABLE   Final Result   No evidence of acute trauma. Cardiomegaly. XR ANKLE LEFT (MIN 3 VIEWS)   Final Result   Displaced fractures of distal shaft of the medial malleolus. Fine osseous   detail obscured by the overlapping cast.      XR ANKLE LEFT (2 VIEWS)   Final Result   Displaced fracture distal shaft of the fibula. Displaced malleolar fracture. Diffuse soft tissue edema. Assessment:    Principal Problem:    Type I or II open fracture of left ankle  Active Problems:    Subtherapeutic anticoagulation    Weakness    Ambulatory dysfunction    Falls frequently    Stage 4 chronic kidney disease (HCC)    RBBB    COPD (chronic obstructive pulmonary disease) ([facility])    History of chronic respiratory failure    Tobacco dependence    Atrial fibrillation ([facility])    Pre-op evaluation    Type 2 diabetes mellitus ([facility] [address]  Resolved Problems:    * No resolved hospital problems. *      Plan: For [facility] planning under progress        Ines Hodge  4:36 PM  11/23/2020    NOTE: This report was transcribed using voice recognition software.  Every effort was made to ensure accuracy; however, inadvertent transcription errors may be present\

## 2020-11-23 NOTE — CARE COORDINATION
11/23, SW met with patient to discuss discharge plan which is [facility]AL[facility]. Discussed recent PT which was 10/24 and ambulated NT. Patient declined list and [facility] the following [facility] re[facility]  [facility] [place] to [facility] in [address] to hear back from the SW in admissions. Dry COVID test given to nurse today. SW to follow for further discharge planning needs.   Nubia Lea, [facility]

## 2020-11-23 NOTE — PROGRESS NOTES
OCCUPATIONAL THERAPY INITIAL EVALUATION      Date:2020  Patient Name: Tawni Kawasaki  MRN: [id number]  : 1940  Room: [address] [place] [address]    Evaluating OT: MAMIE Brown, OTR/L   License #[id number]    Referring physician: Hortensia Peabody, DO     AM-PAC Daily Activity Raw Score:     Recommended Adaptive Equipment: BSC, ww, AE for LB dressing and bathing      Diagnosis: Type 1 or Type II fracture of L ankle     Surgery: L Ankle I & D with ORIF Fixation 20    Precautions:  Falls, Safety, NWB L LE, 3 L O2, Purewick      Home Living: Pt lives with her  in a 3 story home with 3 PHILOMENA and 1 HR. First floor set-up. Bathroom setup: Walk in shower with grab bars, St. Commode   Equipment owned: ww, BSC, shower chair     Prior Level of Function:   Independent with ADLs ,  Independent with IADLs; using ww for mobility. Driving: Yes                           Medication Management Self  Occupation: Pt. Did not state    Pain Level: Pt. Did not complain of pain pre or post session. Cognition: A&O: 4/4; Follows multi step directions   Memory:  Good -    Sequencing:  Fair+    Problem solving:  Fair    Judgement/safety:  Fair           Functional Assessment:    Initial Eval Status  Date: 20 Treatment Status  Date: STGs = LTGs  Time frame: 3-5 days    Feeding Set-Up  Mod I    Grooming Set-Up   Mod I when seated    UB Dressing Min A   Set-Up    LB Dressing Dependent  Pt. Was unable to reach her B/L LEs when long sitting in bed or seated at EOB to [facility] socks   Min A seated   Bathing Mod A   Min A seated with AE    Toileting Mod A    using bedpan   Pt. Was able to complete front hygiene  Min A using BSC    Bed Mobility  Supine to sit:Mod A     Sit to supine: Mod A     Log Rolling: Min A   Supine to sit: Mod I   Sit to supine: Mod I       Functional Transfers Sit > Stand: NT   Stand > Sit: NT   SPT: NT   Pt.  Deferred this session despite encouragement from the therapist   Min A with ww for all functional transfers. Functional Mobility NT   Min A with ww   for all functional distances. Balance Sitting:       Static:  SBA    Dynamic:SBA    Standing: NT  Sitting:       Static:  Independent    Dynamic: Mod I     Standing: Min A with ww   Activity Tolerance Poor+ with light activity. Pt. Was limited by weakness in her LEs and was fearful of getting out of bed. 91 % Sp O2 on 3 L O2  110 bpm      Good with light activity. Visual/  Perceptual Glasses: Yes     Vision: [facility]        Safety Fair with min verbal cues to maintain NWB status for L LE when transferring from EOB to supine   Good + for all ADLs while maintaining NWB for L LE        Hand Dominance Left      Strength ROM Additional Info:    RUE  3-/5  AROM Shoulder 35 degrees  AROM Wrist WFL  AROM Hand WFL   Pt. Reports old rotator cuff injury    : Fair    Fine Motor Coordination: Good     Gross Motor Coordination:Good   LUE 3/5  WFL    : Fair    Fine Motor Coordination: Good     Gross Motor Coordination:Good        Hearing: WFL  Sensation:numbness in L LE   Tone:  WFL  Edema: mild edema noted in the L LE                               Comments:  Upon arrival, patient was supine in bed with HOB slightly elevated and agreeable to evaluation after encouragement from the therapist. At end of session, patient was supine in bed with HOB slightly elevated with call light and phone within reach, all lines and tubes intact. RN notified. Pt required cues and education as noted above for safe facilitation and completion of tasks. Prior to and at the end of session, environmental modifications/line management completed for patients safety and efficiency of treatment session. OT treatment: OT for functional assessment of  ADL, Functional Transfer, Equipment Needs, Pt/Family Education, OT role and POC reviewed.     Skilled occupational therapy services provided include instruction/training on safety and adapted techniques for completion of therapeutic activities, ADLs/IADLs, and deep breathing techniques.  Therapist educated pt on NWB for L LE precautions.  Therapist facilitated bed mobility.  Therapist facilitated self-care: LB dressing, toileting tasks - providing min verbal  cuing on body mechanics, posture, compensatory strategies, and safety.  Therapist simulated LB dressing and bathing .  Therapist educated pt on compensatory strategies techniques to safely complete ADLs/IADLs.  Patient demonstrated fair understanding of NWB status for the L LE.   Patient demonstrated fair understanding of using AE to complete LB dressing and bathing.  Skilled monitoring of O2 sats, HR, and pt response throughout treatment      Patient would benefit from continued skilled OT to increase functional independence and quality of life. Eval Complexity: mod    · Mod Complexity  · History: Expanded chart review of medical records and additional review of physical, cognitive, or psychosocial history related to current functional performance  · Exam: 3+ performance deficits  · Assistance/Modification: Mod/MAx assistance or modifications required to perform tasks. May have comorbidities that affect occupational performance.     Assessment of current deficit   Functional mobility [x]  ADLs [x] Strength [x]  Cognition []  Functional transfers  [x] IADLs [x] Safety Awareness [x]  Endurance [x]  Fine Motor Coordination [] Balance [x] Vision/perception [] Sensation [x]   Gross Motor Coordination [] ROM [x] Delirium []                  Motor Control []    Plan of Care: 1-3 days/week for 1-2 weeks PRN     Instruction/training on adapted ADL techniques and AE recommendations to increase functional independence within precautions  Training on energy conservation strategies/techniques to improve independence/tolerance for self-care routine  Functional transfer/mobility training/DME recommendations for increased independence, safety, and fall prevention  Patient/Family education to increase follow through with safety techniques and functional independence  Recommendation of environmental modifications for increased safety with functional transfers/mobility and ADLs  Therapeutic exercise to improve motor endurance, ROM, and functional strength for ADLs/functional transfers  Therapeutic activities to facilitate/challenge dynamic balancefor increased independence with ADLs  Positioning to improve functional independence    Rehab Potential: Good for established goals    Patient / Family Goal:  Decrease Pain     Patient and/or family were instructed diagnosis, prognosis/goals and plan of care. yes . Demonstrated fair understanding of NWB status for the L LE.    [] Malnutrition indicators have been identified and nursing has been notified to ensure a dietitian consult is ordered. Time In: 2:35             Time Out: 3:10        Total Time: 35 minutes       Min Units   OT Eval Low 83408     OT Eval Medium 30477 X    OT Eval High 41854     OT Re-Eval [id number]     Therapeutic Ex 95459     Therapeutic Activities 09981 8 1   ADL/Self Care 27178 15 1   Orthotic Management 35365     Neuro Re-Ed 72807     Non-Billable Time            Evaluation time includes thorough review of current medical information, gathering information on past medical & social history & PLOF, completion of standardized testing, informal observation of tasks, consultation with other medical professions/disciplines, assessment of data & development of POC/goals.        Gery Jeans, [facility], OTR/L #[id number]

## 2020-11-24 PROBLEM — I51.89 DIASTOLIC DYSFUNCTION: Status: ACTIVE | Noted: 2020-11-24

## 2020-11-24 PROBLEM — J96.11 CHRONIC RESPIRATORY FAILURE WITH HYPOXIA (HCC): Status: ACTIVE | Noted: 2020-11-24

## 2020-11-24 PROBLEM — E03.9 HYPOTHYROIDISM: Status: ACTIVE | Noted: 2020-11-24

## 2020-11-24 PROBLEM — I10 HTN (HYPERTENSION): Status: ACTIVE | Noted: 2020-11-24

## 2020-11-24 PROBLEM — Z79.01 ANTICOAGULATED: Status: ACTIVE | Noted: 2020-11-24

## 2020-11-24 LAB
METER GLUCOSE: 128 MG/DL (ref 74–99)
METER GLUCOSE: 173 MG/DL (ref 74–99)
METER GLUCOSE: 182 MG/DL (ref 74–99)
METER GLUCOSE: 94 MG/DL (ref 74–99)

## 2020-11-24 PROCEDURE — 97535 SELF CARE MNGMENT TRAINING: CPT

## 2020-11-24 PROCEDURE — 94640 AIRWAY INHALATION TREATMENT: CPT

## 2020-11-24 PROCEDURE — 2700000000 HC OXYGEN THERAPY PER DAY

## 2020-11-24 PROCEDURE — 97530 THERAPEUTIC ACTIVITIES: CPT

## 2020-11-24 PROCEDURE — 6370000000 HC RX 637 (ALT 250 FOR IP): Performed by: STUDENT IN AN ORGANIZED HEALTH CARE EDUCATION/TRAINING PROGRAM

## 2020-11-24 PROCEDURE — 1200000000 HC SEMI PRIVATE

## 2020-11-24 PROCEDURE — 82962 GLUCOSE BLOOD TEST: CPT

## 2020-11-24 PROCEDURE — 2580000003 HC RX 258: Performed by: STUDENT IN AN ORGANIZED HEALTH CARE EDUCATION/TRAINING PROGRAM

## 2020-11-24 RX ADMIN — INSULIN LISPRO 2 UNITS: 100 INJECTION, SOLUTION INTRAVENOUS; SUBCUTANEOUS at 17:21

## 2020-11-24 RX ADMIN — IPRATROPIUM BROMIDE AND ALBUTEROL SULFATE 1 AMPULE: .5; 3 SOLUTION RESPIRATORY (INHALATION) at 09:05

## 2020-11-24 RX ADMIN — IPRATROPIUM BROMIDE AND ALBUTEROL SULFATE 1 AMPULE: .5; 3 SOLUTION RESPIRATORY (INHALATION) at 21:53

## 2020-11-24 RX ADMIN — IPRATROPIUM BROMIDE AND ALBUTEROL SULFATE 1 AMPULE: .5; 3 SOLUTION RESPIRATORY (INHALATION) at 13:23

## 2020-11-24 RX ADMIN — APIXABAN 2.5 MG: 2.5 TABLET, FILM COATED ORAL at 08:58

## 2020-11-24 RX ADMIN — INSULIN LISPRO 1 UNITS: 100 INJECTION, SOLUTION INTRAVENOUS; SUBCUTANEOUS at 21:52

## 2020-11-24 RX ADMIN — APIXABAN 2.5 MG: 2.5 TABLET, FILM COATED ORAL at 21:49

## 2020-11-24 RX ADMIN — LEVOTHYROXINE SODIUM 88 MCG: 0.09 TABLET ORAL at 06:40

## 2020-11-24 RX ADMIN — AMLODIPINE BESYLATE 5 MG: 5 TABLET ORAL at 08:58

## 2020-11-24 RX ADMIN — Medication 10 ML: at 22:15

## 2020-11-24 RX ADMIN — IPRATROPIUM BROMIDE AND ALBUTEROL SULFATE 1 AMPULE: .5; 3 SOLUTION RESPIRATORY (INHALATION) at 16:46

## 2020-11-24 RX ADMIN — Medication 10 ML: at 08:59

## 2020-11-24 ASSESSMENT — PAIN SCALES - GENERAL
PAINLEVEL_OUTOF10: 0
PAINLEVEL_OUTOF10: 0

## 2020-11-24 NOTE — PROGRESS NOTES
Hospitalist Progress Note      SYNOPSIS: Patient admitted on 2020 for a left ankle injury beginning after a fall while her  was trying to assist her up from the toilet. She admits to bumping her head and her bottom but reports no pain aside from her left ankle. She denies any loss of consciousness. She is on Eliquis for history of DVT. In ER there was noted to be a large laceration to the medial aspect of the ankle. Orthopedics was consulted in the ER with plans to go to the OR on 2020 for open displaced fractures of distal shaft of the medial malleolus abd displaced fracture of distal fibula. She also complains of weakness and decreased appetite times several days which is why her  is attempting to help her get off of the toilet. : Open reduction and internal fixation left bimalleolar ankle fracture and left ankle syndesmosis  : POD#1. No complaints. Pain well controlled. : POD #2. No complaints. Pain well controlled. Patient expressed fear of falling when getting up with therapy. Encouraged patient to work with therapy to prevent deconditioning. Hospital day 2     SUBJECTIVE:  Stable overnight. No issues reported. Patient seen and examined  Records reviewed. Temp (24hrs), Av.3 °F (36.8 °C), Min:97.9 °F (36.6 °C), Max:98.7 °F (37.1 °C)    DIET: DIET GENERAL;  CODE: Full Code  No intake or output data in the 24 hours ending 20 0656    OBJECTIVE:    BP (!) 114/56   Pulse 81   Temp 98.5 °F (36.9 °C)   Resp 16   Ht 5' 2\" (1.575 m)   Wt 160 lb (72.6 kg)   SpO2 93%   BMI 29.26 kg/m²     General appearance: Elderly female in no apparent distress, appears stated age and cooperative. HEENT: Normal cephalic, atraumatic without obvious deformity. Pupils equal, round, and reactive to light. Extra ocular muscles intact. Conjunctivae/corneas clear. Neck: Supple, with full range of motion. No jugular venous distention.  Trachea midline. Respiratory:  Diffuse wheezing throughout. No apparent distress. Cardiovascular:  Regular rate and rhythm. S1, S2 without murmurs, rubs, or gallops. PV: Brisk capillary refill.  +2 radial pulses bilaterally. 2+ right pedal pulse. Unable to palpate left pedal pulse d/t dressing. Left toes warm with brisk cap refill. No clubbing, cyanosis, edema of bilateral lower extremities. Abdomen: Soft, non-tender, non-distended. +BS  Musculoskeletal: No obvious deformities or erythematous or edematous joints. Limited ROM of the BUE R>L. Skin: Normal skin color. No rashes or lesions. Dressing to E CDI. Neurologic:  Neurovascularly intact without any focal sensory/motor deficits.  Cranial nerves: II-XII intact, grossly non-focal.  Psychiatric: Alert and oriented, thought content appropriate, normal insight    Medications:  REVIEWED DAILY    Infusion Medications    dextrose       Scheduled Medications    sodium chloride flush  10 mL Intravenous 2 times per day    metoprolol tartrate  6.25 mg Oral BID    apixaban  2.5 mg Oral BID    amLODIPine  5 mg Oral Daily    ipratropium-albuterol  1 ampule Inhalation Q4H WA    insulin lispro  0-12 Units Subcutaneous TID WC    insulin lispro  0-6 Units Subcutaneous Nightly    levothyroxine  88 mcg Oral Daily     PRN Meds: polyethylene glycol, promethazine **OR** ondansetron, ipratropium-albuterol, glucose, dextrose, glucagon (rDNA), dextrose, senna, sodium chloride flush, morphine **OR** morphine, benzocaine-menthol, traMADol **OR** traMADol, acetaminophen **OR** acetaminophen    Labs:     Recent Labs     11/21/20 2359 11/22/20 0659 11/23/20  0342   WBC 5.9 8.7 10.3   HGB 12.8 12.9 11.2*   HCT 41.7 41.1 35.9    151 132       Recent Labs     11/21/20 2359 11/22/20 0659 11/23/20  0342    138 139   K 4.5 4.3 5.0    104 105   CO2 31* 25 23   BUN 35* 35* 40*   CREATININE 2.4* 2.3* 2.5*   CALCIUM 9.9 9.4 9.2       Recent Labs     11/21/20 2359 PROT 5.7*   ALKPHOS 101   ALT 6   AST 11   BILITOT 0.6       Recent Labs     11/21/20  2359   INR 1.2       Recent Labs     11/21/20  2359   TROPONINI 0.02       Chronic labs:    Lab Results   Component Value Date    TSH 1.620 11/22/2020    INR 1.2 11/21/2020    LABA1C 5.1 11/22/2020       Radiology: REVIEWED DAILY    ASSESSMENT/PLAN:  Grade 2 open left ankle fracture, bimalleolar status post open reduction and internal fixation. POD#2  Ortho signed off, follow-up with Dr. Marii Taveras in 2 weeks  PT/OT, NWB LLE  Pain control  Bowel regimen  Hemoglobin stable  Not requiring PRN pain meds  Frequent falls  Generalized weakness  History of atrial fibrillation on chronic anticoagulation  Home Eliquis resumed  Stage IV CKD  Cr 2.5, at apparent baseline  HTN, controlled  Home meds resumed  COPD  Duonebs Q4H while awake  Diffuse wheezing   Chronic hypoxic respiratory failure  3 L O2 dependent at baseline  DM type II, controlled  A1C 5.1  Acceptable glycemic control with MDSS  Diastolic dysfunction, grade 2  RBBB  Tobacco dependence  Hypothyroidism  Resume home synthroid  TSH 1.62      DISPOSITION: DONALD- d/c planning in progress.     +++++++++++++++++++++++++++++++++++++++++++++++++  FERMIN Wesley/ Eirc [address]  +++++++++++++++++++++++++++++++++++++++++++++++++  NOTE: This report was transcribed using voice recognition software. Every effort was made to ensure accuracy; however, inadvertent computerized transcription errors may be present.

## 2020-11-24 NOTE — PROGRESS NOTES
Physical Therapy  Physical Therapy Treatment note   Name: Nilda Arteaga  : 1940  MRN: [id number]    Referring Provider:  Ariel Christianson DO    Date of Service: 2020    Evaluating PT:  Yuridia Carreno, PT, DPT [id number]    Room #:  0282/0721-A  Diagnosis:  Ankle fracture L, closed, initial encounter  PMHx/PSHx:  None on file  Procedure/Surgery:  2020  1. Irrigation and debridement left grade 2 open ankle fracture including skin, subcutaneous tissue, muscle, and bone  2. Open reduction internal fixation left bimalleolar ankle fracture  3. Open reduction internal fixation left ankle syndesmosis  Precautions:  Falls, NWB LLE, poor skin integrity   Equipment Needs:  TBD    SUBJECTIVE:    Pt lives with  in a 3 story home with 3 stairs to enter and 1 rail. Bed is on first floor and bath is on first floor. Pt ambulated with front [address] PTA. Pt reports she stays on first floor only. Pt owns BSC but does not use it. OBJECTIVE:   Initial Evaluation  Date: 2020 Treatment   Short Term/ Long Term   Goals   AM-PAC 6 Clicks 10/74 7/71    Was pt agreeable to Eval/treatment? yes yes    Does pt have pain? LLE soreness no    Bed Mobility  Rolling: Reji  Supine to sit: Reji  Sit to supine: Reji  Scooting: Reji Rolling to right min with cues      Supine to sit mod  Scooting  Mod  Sit to supine mod  Rolling: Independent  Supine to sit:  Independent  Sit to supine: Independent  Scooting: Independent   Transfers Sit to stand: NT, pt declined  Stand to sit: NT  Stand pivot: NT Sit to stand max assist and mod assist to maintain nwb L LE   Stand to sit  Max   Stand pivot nt  Sit to stand: Reji  Stand to sit: Reji  Stand pivot: Reji front [address]   Ambulation    NT NT 10 feet with front [address] Reji NWB LLE   Stair negotiation: ascended and descended  NT NT NA   ROM BUE:  Per OT note  BLE:  WNL     Strength BUE:  Per OT note  BLE:  WNL     Balance Sitting EOB:  SBA  Dynamic Standing:  NT  Sitting EOB: Independent  Dynamic Standing:  Reji front [employer] with NWB LLE     Pt is A & O x 4    Patient education Pt educated on importance of participating in therapy and maintaining her strength and moving her good leg and ue in bed within tolerance. Pt educated on weight bearing restrictions. Patient response to education:   Pt verbalized understanding Pt demonstrated skill Pt requires further education in this area   yes yes yes     ASSESSMENT:    Comments:    Pt  In bed upon arrival.    Pt educated on log role and she performed with mod assist.   Pt not able to do much due to B shoulder injuries and weakness. Pt sat eob approx 3o min. Pt required encouragement to do standing today, she is very fearful. Pt performed high scooting along eob with max assist.   Pt then stood 3 x with max assist.   Pt happy with standing. Discussed with pt she needs to try and maintain strength to help when she is healed. Had pt working on lateral scooting toward her right with max assist and using care pads to assist.  Pt returned to bed with mod assist and pt dependent to scoot up in bed. Pt demonstrated quad sets, glut sets and encouraged to move her right good leg. Left leg elevated on 2 pillows  And pt resting with calllight and all needs met. Treatment:  Patient practiced and was instructed in the following treatment:     Therapeutic Activities Completed:  o Functional mobility as noted above:   - Bed mobility:  Mod assist with Increased time and effort to complete. Pt assisted with LLE. Cues for efficient use of BUE on bed rail for more independent completion of task. - Pt sat at EOB x 15 minutes working on scooting  And standing on R LE  o Skilled repositioning in supine with HOB elevated. With le elevated . o Pt education as noted above. Pt's/ family goals   1. Return home. Patient and or family understand(s) diagnosis, prognosis, and plan of care.   yes    PLAN OF CARE:    Current Treatment

## 2020-11-24 NOTE — PROGRESS NOTES
Occupational Therapy  OT BEDSIDE TREATMENT NOTE      Date:2020  Patient Name: Michell Pritchett  MRN: [id number]  : 1940  Room: [address] [place] [address]     Evaluating OT: MAMIE Lima, OTR/L   License #[id number]     Referring physician: Juan Jean DO      AM-PAC Daily Activity Raw Score:      Recommended Adaptive Equipment: BSC, ww, AE for LB dressing and bathing       Diagnosis: Type 1 or Type II fracture of L ankle      Surgery: L Ankle I & D with ORIF Fixation 20     Precautions:  Falls, Safety, NWB L LE, 3 L O2, Purewick       Home Living: Pt lives with her  in a 3 story home with 3 PHILOMENA and 1 HR. First floor set-up.      Bathroom setup: Walk in shower with grab bars, St. Commode   Equipment owned: ww, BSC, shower chair      Prior Level of Function:   Independent with ADLs ,  Independent with IADLs; using ww for mobility.      Driving: Yes                           Medication Management Self  Occupation: Pt. Did not state     Pain Level: Pt. Did not complain of pain pre or post session.      Cognition: A&O: 4/4; Follows multi step directions              Memory:  Good -               Sequencing:  Fair+               Problem solving:  Fair               Judgement/safety:  Fair                      Functional Assessment:     Initial Eval Status  Date: 20 Treatment Status  Date: 20 STGs = LTGs  Time frame: 3-5 days    Feeding Set-Up  Setup   Mod I    Grooming Set-Up  Setup; To perform washing face, combing hair and brushing teeth while sitting EOB.     Mod I when seated    UB Dressing Min A   Min A;    Due to limitation in BUE. Set-Up    LB Dressing Dependent  Pt. Was unable to reach her B/L LEs when long sitting in bed or seated at EOB to [facility]  Dep; To complete zaki/doff of sock.     Mod A    Bathing Mod A   Mod A; To perform bathing tasks while sitting EOB with increased time and Mod A for LB.     Min A seated with AE    Toileting Mod A    using bedpan Pt. Was able to complete front hygiene  Dep; Incontinent Min A using BSC    Bed Mobility  Supine to sit:Mod A     Sit to supine: Mod A      Log Rolling: Min A  Supine to sit: Mod A  Supine to sit:   Sit to supine:    Supine to sit: Min A   Sit to supine: Min A        Functional Transfers Sit > Stand: NT   Stand > Sit: NT   SPT: NT   Pt. Deferred this session despite encouragement from the therapist  Sit <> Stand:  Max A with assist of 1 to assist with LLE. Mod A with ww for all functional transfers. Functional Mobility NT  N/A  Min A with ww   for all functional distances. Balance Sitting:        Static:  SBA    Dynamic:SBA     Standing: NT  Sitting:     Static: Indep    Dynamic:Sup     Standing: max A of 2 Sitting:        Static:  Independent         Dynamic: Independent     Standing: Mod A with ww   Activity Tolerance Poor+ with light activity. Pt. Was limited by weakness in her LEs and was fearful of getting out of bed.      91 % Sp O2 on 3 L O2  110 bpm        Poor+ with increased time to improve participation. Fair + with light activity. Visual/  Perceptual Glasses: Yes      Vision: [employer]          Safety Fair with min verbal cues to maintain NWB status for L LE when transferring from EOB to supine   Fair Good  for all ADLs while maintaining NWB for L LE       Hand Dominance Left        Strength ROM Additional Info:    RUE  3-/5  AROM Shoulder 35 degrees  AROM Wrist WFL  AROM Hand WFL   Pt.  Reports old rotator cuff injury     : Fair     Fine Motor Coordination: Good      Gross Motor Coordination:Good   LUE 3/5  WFL     : Fair     Fine Motor Coordination: Good      Gross Motor Coordination:Good       Hearing: WFL  Sensation:numbness in L LE   Tone:  WFL  Edema: mild edema noted in the L LE                             Comments:  Upon arrival, patient was supine in bed with HOB slightly elevated and agreeable to evaluation after encouragement from the therapist. At end of session, patient was supine in bed with HOB slightly elevated with call light and phone within reach, all lines and tubes intact. RN notified.      Pt required cues and education as noted above for safe facilitation and completion of tasks. Prior to and at the end of session, environmental modifications/line management completed for patients safety and efficiency of treatment session.      Time In: 1435            Time Out:  1515       Total Time: 40 minutes         Min Units   OT Eval Low 59948       OT Eval Medium 93277     OT Eval High 74740     OT Re-Eval [id number]     Therapeutic Ex 35653     Therapeutic Activities 99679     ADL/Self Care 51984 15 1   Orthotic Management 83785       Neuro Re-Ed 31127       Non-Billable Time  25  0             [address] [facility], [address]    I agree with the above documentation that is written. Please see above for updated goals that are bolded.  Thank you  Roberto Muñoz, OTR/L #[id number]

## 2020-11-24 NOTE — PROGRESS NOTES
Department of Orthopedic Surgery  Resident Progress Note    Patient seen and examined. Pain controlled. No new complaints. Denies chest pain, shortness of breath, calf pain, dizziness/lightheadedness.     VITALS:  BP (!) 114/56   Pulse 81   Temp 98.5 °F (36.9 °C)   Resp 16   Ht 5' 2\" (1.575 m)   Wt 160 lb (72.6 kg)   SpO2 93%   BMI 29.26 kg/m²     GENERAL: alert and oriented  MUSCULOSKELETAL:   left lower extremity:  · Splint C/D/I  · Compartments soft and compressible, calf non-tender  · Palpable dorsalis pedis pulse, brisk cap refill to toes, foot warm and perfused  · Sensation intact to light touch distally to foot and all toes  · Demonstrates active toe extension/flexion    CBC:   Lab Results   Component Value Date    WBC 10.3 11/23/2020    HGB 11.2 11/23/2020    HCT 35.9 11/23/2020     11/23/2020       ASSESSMENT  · S/p L ankle ORIF 11/22    PLAN    NWB LLE  Pain control IV & PO  DVT prophylaxis- lovenox, early mobilization  Monitor Labs  Bowel regiment  Pulmonary hygiene   Trend H&H  24 hr post op ABX completed  PT/OT  D/C planning, SW/PT recs  No further acute orthopedic intervention at this time  Patient to follow up with Dr. Samantha Quinones in office  Discuss with attending

## 2020-11-24 NOTE — CARE COORDINATION
[facility] has not beds. [facility] is testing for Covid and [facility] is not authorized to accept admissions yet. Willa Shrestha said to check back Wednesday or Friday.  3rd. [facility]. Left VM about referral with call back number. APPLE [place]  [phone]    12:39  Update Pt on referrals. Pt is thinking about other choices to give SW.

## 2020-11-25 LAB
METER GLUCOSE: 146 MG/DL (ref 74–99)
METER GLUCOSE: 177 MG/DL (ref 74–99)
METER GLUCOSE: 304 MG/DL (ref 74–99)
METER GLUCOSE: 71 MG/DL (ref 74–99)

## 2020-11-25 PROCEDURE — 2700000000 HC OXYGEN THERAPY PER DAY

## 2020-11-25 PROCEDURE — 97530 THERAPEUTIC ACTIVITIES: CPT

## 2020-11-25 PROCEDURE — 1200000000 HC SEMI PRIVATE

## 2020-11-25 PROCEDURE — 94640 AIRWAY INHALATION TREATMENT: CPT

## 2020-11-25 PROCEDURE — 6370000000 HC RX 637 (ALT 250 FOR IP): Performed by: NURSE PRACTITIONER

## 2020-11-25 PROCEDURE — 82962 GLUCOSE BLOOD TEST: CPT

## 2020-11-25 PROCEDURE — 2580000003 HC RX 258: Performed by: STUDENT IN AN ORGANIZED HEALTH CARE EDUCATION/TRAINING PROGRAM

## 2020-11-25 PROCEDURE — 6370000000 HC RX 637 (ALT 250 FOR IP): Performed by: STUDENT IN AN ORGANIZED HEALTH CARE EDUCATION/TRAINING PROGRAM

## 2020-11-25 RX ORDER — PREDNISONE 20 MG/1
40 TABLET ORAL DAILY
Status: COMPLETED | OUTPATIENT
Start: 2020-11-25 | End: 2020-11-29

## 2020-11-25 RX ADMIN — METOPROLOL TARTRATE 6.25 MG: 25 TABLET, FILM COATED ORAL at 21:24

## 2020-11-25 RX ADMIN — METOPROLOL TARTRATE 6.25 MG: 25 TABLET, FILM COATED ORAL at 09:35

## 2020-11-25 RX ADMIN — PREDNISONE 40 MG: 20 TABLET ORAL at 13:49

## 2020-11-25 RX ADMIN — LEVOTHYROXINE SODIUM 88 MCG: 0.09 TABLET ORAL at 06:14

## 2020-11-25 RX ADMIN — INSULIN LISPRO 2 UNITS: 100 INJECTION, SOLUTION INTRAVENOUS; SUBCUTANEOUS at 17:37

## 2020-11-25 RX ADMIN — INSULIN LISPRO 2 UNITS: 100 INJECTION, SOLUTION INTRAVENOUS; SUBCUTANEOUS at 09:35

## 2020-11-25 RX ADMIN — IPRATROPIUM BROMIDE AND ALBUTEROL SULFATE 1 AMPULE: .5; 3 SOLUTION RESPIRATORY (INHALATION) at 08:20

## 2020-11-25 RX ADMIN — INSULIN LISPRO 4 UNITS: 100 INJECTION, SOLUTION INTRAVENOUS; SUBCUTANEOUS at 21:28

## 2020-11-25 RX ADMIN — APIXABAN 2.5 MG: 2.5 TABLET, FILM COATED ORAL at 09:35

## 2020-11-25 RX ADMIN — AMLODIPINE BESYLATE 5 MG: 5 TABLET ORAL at 09:35

## 2020-11-25 RX ADMIN — SENNOSIDES 8.6 MG: 8.6 TABLET, FILM COATED ORAL at 12:07

## 2020-11-25 RX ADMIN — APIXABAN 2.5 MG: 2.5 TABLET, FILM COATED ORAL at 21:23

## 2020-11-25 RX ADMIN — IPRATROPIUM BROMIDE AND ALBUTEROL SULFATE 1 AMPULE: .5; 3 SOLUTION RESPIRATORY (INHALATION) at 12:35

## 2020-11-25 RX ADMIN — IPRATROPIUM BROMIDE AND ALBUTEROL SULFATE 1 AMPULE: .5; 3 SOLUTION RESPIRATORY (INHALATION) at 17:21

## 2020-11-25 NOTE — PLAN OF CARE
Problem: Skin Integrity:  Goal: Absence of new skin breakdown  Description: Absence of new skin breakdown  11/25/2020 1010 by Virgilio Allen RN  Outcome: Met This Shift     Problem: Skin Integrity:  Goal: Risk for impaired skin integrity will decrease  Description: Risk for impaired skin integrity will decrease  Outcome: Met This Shift     Problem: Pain:  Goal: Pain level will decrease  Description: Pain level will decrease  11/25/2020 1010 by Virgilio Allen RN  Outcome: Met This Shift     Problem: Pain:  Goal: Control of acute pain  Description: Control of acute pain  11/25/2020 1010 by Virgilio Allen RN  Outcome: Met This Shift

## 2020-11-25 NOTE — DISCHARGE INSTR - COC
F17.200    Atrial fibrillation (HCC) I48.91    Pre-op evaluation Z01.818    Type 2 diabetes mellitus (HCC) E11.9    Chronic respiratory failure with hypoxia (HCC) J96.11    HTN (hypertension) I10    Hypothyroidism S06.9    Diastolic dysfunction A29.22    Anticoagulated Z79.01       Isolation/Infection:   Isolation            No Isolation          Patient Infection Status       Infection Onset Added Last Indicated Last Indicated By Review Planned Expiration Resolved Resolved By    None active    Resolved    COVID-19 Rule Out 11/23/20 11/23/20 11/23/20 COVID-19 (Ordered)   11/23/20 Rule-Out Test Resulted            Nurse Assessment:  Last Vital Signs: BP (!) 103/52   Pulse 64   Temp 97.9 °F (36.6 °C) (Temporal)   Resp 16   Ht 5' 2\" (1.575 m)   Wt 160 lb (72.6 kg)   SpO2 93%   BMI 29.26 kg/m²     Last documented pain score (0-10 scale): Pain Level: 0  Last Weight:   Wt Readings from Last 1 Encounters:   11/21/20 160 lb (72.6 kg)     Mental Status:  {IP PT MENTAL STATUS:20030:::0}    IV Access:  { CHELI IV ACCESS:384523694:::0}    Nursing Mobility/ADLs:  Walking   {CHP DME ADLs:311519938:::0}  Transfer  {CHP DME ADLs:370291747:::0}  Bathing  {CHP DME ADLs:727218029:::0}  Dressing  {CHP DME ADLs:375690801:::0}  Toileting  {CHP DME ADLs:862639068:::0}  Feeding  {CHP DME ADLs:248801496:::0}  Med Admin  {CHP DME ADLs:196266142:::0}  Med Delivery   { CHELI MED Delivery:191218121:::0}    Wound Care Documentation and Therapy:  Wound Ankle Anterior; Left ace wrapped, awaiting surgery 13932 on 11/22/2020 (Active)   Number of days:        Wound 11/22/20 Left Per surgery note - obtained when transferring to OR table (Active)   Dressing Status Clean;Dry; Intact 11/25/20 0000   Dressing/Treatment Silicone pad;Dry dressing; Other (comment) 11/22/20 1306   Dressing Change Due 12/06/20 11/25/20 0000   Wound Length (cm) 5 cm 11/22/20 1145   Wound Width (cm) 5 cm 11/22/20 1145   Wound Depth (cm) 0.2 cm 11/22/20 1145   Wound Surface Area (cm^2) 25 cm^2 20 1145   Wound Volume (cm^3) 5 cm^3 20 1145   Drainage Amount None 20 0000   Number of days: 2        Elimination:  Continence:   · Bowel: {YES / WT:73332}  · Bladder: {YES / EL:50938}  Urinary Catheter: {Urinary Catheter:573007978:::0}   Colostomy/Ileostomy/Ileal Conduit: {YES / IQ:42559}       Date of Last BM: ***    Intake/Output Summary (Last 24 hours) at 2020 0736  Last data filed at 2020 1323  Gross per 24 hour   Intake 660 ml   Output --   Net 660 ml     I/O last 3 completed shifts:   In: 65 [P.O.:660]  Out: -     Safety Concerns:     [address] Concerns:579414171:::0}    Impairments/Disabilities:      [address] Impairments/Disabilities:323402603:::0}    Nutrition Therapy:  Current Nutrition Therapy:   [address] Diet List:134667187:::0}    Routes of Feeding: {CHP DME Other Feedings:228748339:::0}  Liquids: {Slp liquid thickness:90665}  Daily Fluid Restriction: {CHP DME Yes amt example:800325754:::0}  Last Modified Barium Swallow with Video (Video Swallowing Test): {Done Not Done LDSW:083347020:::4}    Treatments at the Time of Hospital Discharge:   Respiratory Treatments: ***  Oxygen Therapy:  {Therapy; copd oxygen:35011:::0}  Ventilator:    {[facility] Vent List:110459275:::0}    Rehab Therapies: {THERAPEUTIC INTERVENTION:2959254906}  Weight Bearing Status/Restrictions: [address]  Weight Bearin:::0}  Other Medical Equipment (for information only, NOT a DME order):  {EQUIPMENT:850020725}  Other Treatments: ***    Patient's personal belongings (please select all that are sent with patient):  {CHP DME Belongings:535347911:::0}    RN SIGNATURE:  {Esignature:854281591:::0}    CASE MANAGEMENT/SOCIAL WORK SECTION    Inpatient Status Date: ***    Readmission Risk Assessment Score:  Readmission Risk              Risk of Unplanned Readmission:        15           Discharging to Facility/ Agency   · Name: [facility]  · Address:  · Phone:  · Fax:    Dialysis Facility (if applicable)   · Name:  · Address:  · Dialysis Schedule:  · Phone:  · Fax:    / signature: Electronically signed by Bonnetta Habermann, LSW on 11/30/2020 at 4:09 PM      PHYSICIAN SECTION    Prognosis: Good    Condition at Discharge: Stable    Rehab Potential (if transferring to Rehab): Good    Recommended Labs or Other Treatments After Discharge: NWB LLE. Follow up with PCP within one week. Follow up with Dr. Delsa Mcardle as scheduled. BMP in 3 days. Physician Certification: I certify the above information and transfer of Rudolph Frances  is necessary for the continuing treatment of the diagnosis listed and that she requires [facility] for less 30 days.      Update Admission H&P: No change in H&P    PHYSICIAN SIGNATURE:  Electronically signed by KIM Seaman NP on 11/25/20 at 7:37 AM EST

## 2020-11-25 NOTE — CARE COORDINATION
[facility] not able to accept admissions this week d/rt covid and staffing issues. [facility] not able to accept admissions. [address] [facility] ([phone], fax [phone]) no beds until maybe next week at the earliest.  Faxed referral information. Discharge plan is to [facility]. Jonna/NORIS to follow for discharge needs.    Alma Jaimes, L.S.W.  [phone]

## 2020-11-25 NOTE — PROGRESS NOTES
Hospitalist Progress Note      SYNOPSIS: Patient admitted on 2020 for a left ankle injury beginning after a fall while her  was trying to assist her up from the toilet. She admits to bumping her head and her bottom but reports no pain aside from her left ankle. She denies any loss of consciousness. She is on Eliquis for history of DVT. In ER there was noted to be a large laceration to the medial aspect of the ankle. Orthopedics was consulted in the ER with plans to go to the OR on 2020 for open displaced fractures of distal shaft of the medial malleolus abd displaced fracture of distal fibula. She also complains of weakness and decreased appetite times several days which is why her  is attempting to help her get off of the toilet. : Open reduction and internal fixation left bimalleolar ankle fracture and left ankle syndesmosis  : POD#1. No complaints. Pain well controlled. : POD #2. No complaints. Pain well controlled. Patient expressed fear of falling when getting up with therapy. Encouraged patient to work with therapy to prevent deconditioning. : POD #3. Karolina Camarillo for d/c per ortho with follow up. Increased wheezing. Will start PO steroids. Awaiting acceptance to [facility]. Stable for discharge. Hospital day 3     SUBJECTIVE:  Stable overnight. No issues reported. Patient seen and examined  Records reviewed.        Temp (24hrs), Av.9 °F (36.6 °C), Min:97.2 °F (36.2 °C), Max:98.4 °F (36.9 °C)    DIET: DIET GENERAL;  CODE: Full Code    Intake/Output Summary (Last 24 hours) at 2020 0725  Last data filed at 2020 1323  Gross per 24 hour   Intake 660 ml   Output --   Net 660 ml       OBJECTIVE:    BP (!) 103/52   Pulse 64   Temp 97.9 °F (36.6 °C) (Temporal)   Resp 16   Ht 5' 2\" (1.575 m)   Wt 160 lb (72.6 kg)   SpO2 93%   BMI 29.26 kg/m²     General appearance: Elderly female in no apparent distress, appears stated age and cooperative. HEENT: Normal cephalic, atraumatic without obvious deformity. Pupils equal, round, and reactive to light. Extra ocular muscles intact. Conjunctivae/corneas clear. Neck: Supple, with full range of motion. No jugular venous distention. Trachea midline. Respiratory:  Diffuse wheezing throughout. No apparent distress. Cardiovascular:  Regular rate and rhythm. S1, S2 without murmurs, rubs, or gallops. PV: Brisk capillary refill.  +2 radial pulses bilaterally. 2+ right pedal pulse. Unable to palpate left pedal pulse d/t dressing. Left toes warm with brisk cap refill. No clubbing, cyanosis, edema of bilateral lower extremities. Abdomen: Soft, non-tender, non-distended. +BS  Musculoskeletal: No obvious deformities or erythematous or edematous joints. Limited ROM of the BUE R>L. Skin: Normal skin color. No rashes or lesions. Dressing to E CDI. Neurologic:  Neurovascularly intact without any focal sensory/motor deficits.  Cranial nerves: II-XII intact, grossly non-focal.  Psychiatric: Alert and oriented, thought content appropriate, normal insight    Medications:  REVIEWED DAILY    Infusion Medications    dextrose       Scheduled Medications    sodium chloride flush  10 mL Intravenous 2 times per day    metoprolol tartrate  6.25 mg Oral BID    apixaban  2.5 mg Oral BID    amLODIPine  5 mg Oral Daily    ipratropium-albuterol  1 ampule Inhalation Q4H WA    insulin lispro  0-12 Units Subcutaneous TID WC    insulin lispro  0-6 Units Subcutaneous Nightly    levothyroxine  88 mcg Oral Daily     PRN Meds: polyethylene glycol, promethazine **OR** ondansetron, ipratropium-albuterol, glucose, dextrose, glucagon (rDNA), dextrose, senna, sodium chloride flush, morphine **OR** morphine, benzocaine-menthol, traMADol **OR** traMADol, acetaminophen **OR** acetaminophen    Labs:     Recent Labs     11/23/20  0342   WBC 10.3   HGB 11.2*   HCT 35.9          Recent Labs     11/23/20  0342      K 5.0      CO2 23   BUN 40*   CREATININE 2.5*   CALCIUM 9.2       No results for input(s): PROT, ALB, ALKPHOS, ALT, AST, BILITOT, AMYLASE, LIPASE in the last 72 hours. No results for input(s): INR in the last 72 hours. No results for input(s): Waldemar Lauke in the last 72 hours. Chronic labs:    Lab Results   Component Value Date    TSH 1.620 11/22/2020    INR 1.2 11/21/2020    LABA1C 5.1 11/22/2020       Radiology: REVIEWED DAILY    ASSESSMENT/PLAN:  Grade 2 open left ankle fracture, bimalleolar status post open reduction and internal fixation. POD#2  Ortho signed off, follow-up with Dr. Ness Mehta in 2 weeks  PT/OT, NWB LLE  Pain control  Bowel regimen  Hemoglobin stable  Not requiring PRN pain meds  Frequent falls  Generalized weakness  History of atrial fibrillation on chronic anticoagulation  Home Eliquis resumed  Stage IV CKD  Cr 2.5, at apparent baseline  HTN, controlled  Home meds resumed  COPD  Duonebs Q4H while awake  Diffuse wheezing   Prednisone 40mg PO x 5 D. Chronic hypoxic respiratory failure  3 L O2 dependent at baseline  DM type II, controlled  A1C 5.1  Acceptable glycemic control with MDSS  Diastolic dysfunction, grade 2  RBBB  Tobacco dependence  Hypothyroidism  Resume home synthroid  TSH 1.62        DISPOSITION: [facility]- d/c planning in progress.       +++++++++++++++++++++++++++++++++++++++++++++++++  FERMIN Stone/ Eric Schroeder [address] [place] [address]  +++++++++++++++++++++++++++++++++++++++++++++++++  NOTE: This report was transcribed using voice recognition software. Every effort was made to ensure accuracy; however, inadvertent computerized transcription errors may be present.

## 2020-11-25 NOTE — PROGRESS NOTES
Physical Therapy  Physical Therapy Treatment note   Name: Umu Hall  : 1940  MRN: [id number]    Referring Provider:  Skylar Kelly DO    Date of Service: 2020    Evaluating PT:  Shona Gautam, PT, DPT [id number]    Room #:  1162/2895-R  Diagnosis:  Ankle fracture L, closed, initial encounter  PMHx/PSHx:  None on file  Procedure/Surgery:  2020  1. Irrigation and debridement left grade 2 open ankle fracture including skin, subcutaneous tissue, muscle, and bone  2. Open reduction internal fixation left bimalleolar ankle fracture  3. Open reduction internal fixation left ankle syndesmosis  Precautions:  Falls, NWB LLE, poor skin integrity   Equipment Needs:  TBD    SUBJECTIVE:    Pt lives with  in a 3 story home with 3 stairs to enter and 1 rail. Bed is on first floor and bath is on first floor. Pt ambulated with front [employer] Luis F PTA. Pt reports she stays on first floor only. Pt owns BSC but does not use it. OBJECTIVE:   Initial Evaluation  Date: 2020 Treatment   Short Term/ Long Term   Goals   AM-PAC 6 Clicks 59/52 75/55    Was pt agreeable to Eval/treatment? yes yes    Does pt have pain? LLE soreness no    Bed Mobility  Rolling: Reji  Supine to sit: Reji  Sit to supine: Reji  Scooting: Reji Rolling to right sba with increase time to complete with cues    Supine to sit min  Scooting  Min to eob sitting   Sit to supine mod  With cues   Rolling: Independent  Supine to sit:  Independent  Sit to supine: Independent  Scooting: Independent   Transfers Sit to stand: NT, pt declined  Stand to sit: NT  Stand pivot: NT Sit to stand max assist with no device  and pt maintaining nwb L LE with standing      Stand to sit  Max   Stand pivot nt  Sit to stand: Reji  Stand to sit: Reji  Stand pivot: Reji front [employer]   Ambulation    NT NT 10 feet with front [employer] Reji NWB LLE   Stair negotiation: ascended and descended  NT NT NA   ROM BUE:  Per OT note  BLE:  WNL     Strength BUE:  Per OT note  BLE:  WNL     Balance Sitting EOB:  SBA  Dynamic Standing:  NT  Sitting EOB:  Independent  Dynamic Standing:  Reji front [employer] with NWB LLE     Pt is A & O x 4    Patient education Pt educated  Isometric ex, transfers     Patient response to education:   Pt verbalized understanding Pt demonstrated skill Pt requires further education in this area   yes yes yes     ASSESSMENT:    Comments:    Pt  In bed upon arrival.    Pt educated on log role and she performed with decrease assist and increase time. Pt scooting much better to the eob today. Pt sat eob approx 3o min. Pt stood with max assist of 1 and pt helping more today with pushing through her right leg. Pt performed supine ex -  L LE  With assist  Heel slides, slr and abd, quad sets on her left and right, glut sets. Pt performed slr on her right with light assist, hip abd and slr. Educated pt on doing isometrics in bed and importance of sitting up eob and standing with assist.      Pt returned to bed with mod assist and pt dependent to scoot up in bed. Pt demonstrated quad sets, glut sets and encouraged to move her right good leg. Left leg elevated on 2 pillows  And pt resting with calllight and all needs met. Treatment:  Patient practiced and was instructed in the following treatment:     Therapeutic Activities Completed:  o Functional mobility as noted above:   - Bed mobility:  Mod assist with Increased time and effort to complete. Pt assisted with LLE. Cues for efficient use of BUE on bed rail for more independent completion of task. - Pt sat at EOB x 30  minutes working on scooting  And standing on R LE  o Skilled repositioning in supine with HOB elevated. With le elevated . o Pt education as noted above. Pt's/ family goals   1. Return home. Patient and or family understand(s) diagnosis, prognosis, and plan of care.   yes    PLAN OF CARE:    Current Treatment Recommendations     [x] Strengthening     [x] ROM   [x] Balance Training   [x] Endurance Training   [x] Transfer Training   [x] Gait Training   [x] Stair Training   [x] Positioning   [x] Safety and Education Training   [x] Patient/Caregiver Education   [] HEP  [] Other     PT care will be provided in accordance with the objectives noted above. The above treatment recommendations will be utilized to address deficits described above in order to restore pt's prior level of function and/or achieve modified functional independence with adaptive strategies. Frequency of treatments: 2-5x/week x 1-2 weeks. Time in 1120  Time out  1205    Total Treatment Time  45  minutes     and goals.     CPT codes:  [] Low Complexity PT evaluation 47154  [] Moderate Complexity PT evaluation 34507  [] High Complexity PT evaluation 63491  [] PT Re-evaluation 45428  [] Gait training 92732 0 minutes  [] Manual therapy 65515 0 minutes  [x] Therapeutic activities 38389 45 minutes  [] Therapeutic exercises 53885 0 minutes  [] Neuromuscular reeducation 67856 0 minutes     Antonella Dorsey, PTA  [id number]

## 2020-11-25 NOTE — PLAN OF CARE
Problem: Skin Integrity:  Goal: Absence of new skin breakdown  Description: Absence of new skin breakdown  Outcome: Met This Shift     Problem: Pain:  Goal: Pain level will decrease  Description: Pain level will decrease  Outcome: Met This Shift  Goal: Control of acute pain  Description: Control of acute pain  Outcome: Met This Shift     Problem:  Activity:  Goal: Ability to tolerate increased activity will improve  Description: Ability to tolerate increased activity will improve  Outcome: Met This Shift

## 2020-11-26 ENCOUNTER — APPOINTMENT (OUTPATIENT)
Dept: GENERAL RADIOLOGY | Age: 80
DRG: 492 | End: 2020-11-26
Payer: MEDICARE

## 2020-11-26 LAB
ANION GAP SERPL CALCULATED.3IONS-SCNC: 10 MMOL/L (ref 7–16)
BUN BLDV-MCNC: 43 MG/DL (ref 8–23)
CALCIUM SERPL-MCNC: 8.9 MG/DL (ref 8.6–10.2)
CHLORIDE BLD-SCNC: 104 MMOL/L (ref 98–107)
CO2: 22 MMOL/L (ref 22–29)
CREAT SERPL-MCNC: 2 MG/DL (ref 0.5–1)
FOLATE: 3.1 NG/ML (ref 4.8–24.2)
GFR AFRICAN AMERICAN: 29
GFR NON-AFRICAN AMERICAN: 24 ML/MIN/1.73
GLUCOSE BLD-MCNC: 166 MG/DL (ref 74–99)
HCT VFR BLD CALC: 31.5 % (ref 34–48)
HEMOGLOBIN: 10 G/DL (ref 11.5–15.5)
MCH RBC QN AUTO: 32.3 PG (ref 26–35)
MCHC RBC AUTO-ENTMCNC: 31.7 % (ref 32–34.5)
MCV RBC AUTO: 101.6 FL (ref 80–99.9)
METER GLUCOSE: 142 MG/DL (ref 74–99)
METER GLUCOSE: 199 MG/DL (ref 74–99)
METER GLUCOSE: 235 MG/DL (ref 74–99)
METER GLUCOSE: 242 MG/DL (ref 74–99)
PDW BLD-RTO: 16.4 FL (ref 11.5–15)
PLATELET # BLD: 162 E9/L (ref 130–450)
PMV BLD AUTO: 10.3 FL (ref 7–12)
POTASSIUM SERPL-SCNC: 5.2 MMOL/L (ref 3.5–5)
RBC # BLD: 3.1 E12/L (ref 3.5–5.5)
SODIUM BLD-SCNC: 136 MMOL/L (ref 132–146)
VITAMIN B-12: 386 PG/ML (ref 211–946)
WBC # BLD: 5.7 E9/L (ref 4.5–11.5)

## 2020-11-26 PROCEDURE — 36415 COLL VENOUS BLD VENIPUNCTURE: CPT

## 2020-11-26 PROCEDURE — 1200000000 HC SEMI PRIVATE

## 2020-11-26 PROCEDURE — 2700000000 HC OXYGEN THERAPY PER DAY

## 2020-11-26 PROCEDURE — 94640 AIRWAY INHALATION TREATMENT: CPT

## 2020-11-26 PROCEDURE — 85027 COMPLETE CBC AUTOMATED: CPT

## 2020-11-26 PROCEDURE — 71045 X-RAY EXAM CHEST 1 VIEW: CPT

## 2020-11-26 PROCEDURE — 97530 THERAPEUTIC ACTIVITIES: CPT

## 2020-11-26 PROCEDURE — 2580000003 HC RX 258: Performed by: STUDENT IN AN ORGANIZED HEALTH CARE EDUCATION/TRAINING PROGRAM

## 2020-11-26 PROCEDURE — 82607 VITAMIN B-12: CPT

## 2020-11-26 PROCEDURE — 6370000000 HC RX 637 (ALT 250 FOR IP): Performed by: STUDENT IN AN ORGANIZED HEALTH CARE EDUCATION/TRAINING PROGRAM

## 2020-11-26 PROCEDURE — 80048 BASIC METABOLIC PNL TOTAL CA: CPT

## 2020-11-26 PROCEDURE — 82746 ASSAY OF FOLIC ACID SERUM: CPT

## 2020-11-26 PROCEDURE — 82962 GLUCOSE BLOOD TEST: CPT

## 2020-11-26 PROCEDURE — 6370000000 HC RX 637 (ALT 250 FOR IP): Performed by: NURSE PRACTITIONER

## 2020-11-26 RX ORDER — FOLIC ACID 1 MG/1
1 TABLET ORAL DAILY
Status: DISCONTINUED | OUTPATIENT
Start: 2020-11-26 | End: 2020-12-01 | Stop reason: HOSPADM

## 2020-11-26 RX ADMIN — METOPROLOL TARTRATE 6.25 MG: 25 TABLET, FILM COATED ORAL at 21:21

## 2020-11-26 RX ADMIN — POLYETHYLENE GLYCOL 3350 17 G: 17 POWDER, FOR SOLUTION ORAL at 16:05

## 2020-11-26 RX ADMIN — IPRATROPIUM BROMIDE AND ALBUTEROL SULFATE 1 AMPULE: .5; 3 SOLUTION RESPIRATORY (INHALATION) at 15:42

## 2020-11-26 RX ADMIN — IPRATROPIUM BROMIDE AND ALBUTEROL SULFATE 1 AMPULE: .5; 3 SOLUTION RESPIRATORY (INHALATION) at 07:56

## 2020-11-26 RX ADMIN — IPRATROPIUM BROMIDE AND ALBUTEROL SULFATE 1 AMPULE: .5; 3 SOLUTION RESPIRATORY (INHALATION) at 19:32

## 2020-11-26 RX ADMIN — INSULIN LISPRO 1 UNITS: 100 INJECTION, SOLUTION INTRAVENOUS; SUBCUTANEOUS at 21:23

## 2020-11-26 RX ADMIN — IPRATROPIUM BROMIDE AND ALBUTEROL SULFATE 1 AMPULE: .5; 3 SOLUTION RESPIRATORY (INHALATION) at 11:43

## 2020-11-26 RX ADMIN — Medication 10 ML: at 21:20

## 2020-11-26 RX ADMIN — INSULIN LISPRO 1 UNITS: 100 INJECTION, SOLUTION INTRAVENOUS; SUBCUTANEOUS at 12:05

## 2020-11-26 RX ADMIN — APIXABAN 2.5 MG: 2.5 TABLET, FILM COATED ORAL at 08:55

## 2020-11-26 RX ADMIN — APIXABAN 2.5 MG: 2.5 TABLET, FILM COATED ORAL at 21:21

## 2020-11-26 RX ADMIN — LEVOTHYROXINE SODIUM 88 MCG: 0.09 TABLET ORAL at 06:30

## 2020-11-26 RX ADMIN — AMLODIPINE BESYLATE 5 MG: 5 TABLET ORAL at 08:55

## 2020-11-26 RX ADMIN — PREDNISONE 40 MG: 20 TABLET ORAL at 08:55

## 2020-11-26 RX ADMIN — INSULIN LISPRO 2 UNITS: 100 INJECTION, SOLUTION INTRAVENOUS; SUBCUTANEOUS at 17:13

## 2020-11-26 RX ADMIN — INSULIN LISPRO 1 UNITS: 100 INJECTION, SOLUTION INTRAVENOUS; SUBCUTANEOUS at 08:56

## 2020-11-26 RX ADMIN — SENNOSIDES 8.6 MG: 8.6 TABLET, FILM COATED ORAL at 12:02

## 2020-11-26 RX ADMIN — FOLIC ACID 1 MG: 1 TABLET ORAL at 13:49

## 2020-11-26 NOTE — PLAN OF CARE
Problem: Skin Integrity:  Goal: Absence of new skin breakdown  Description: Absence of new skin breakdown  11/26/2020 1402 by Charlotte Merchant RN  Outcome: Met This Shift  11/26/2020 1147 by Charlotte Merchant RN  Outcome: Met This Shift     Problem:  Activity:  Goal: Ability to tolerate increased activity will improve  Description: Ability to tolerate increased activity will improve  Outcome: Met This Shift

## 2020-11-26 NOTE — PROGRESS NOTES
Hospitalist Progress Note       SYNOPSIS: Patient admitted on 2020 for a left ankle injury beginning after a fall while her  was trying to assist her up from the toilet.  She admits to bumping her head and her bottom but reports no pain aside from her left ankle.  She denies any loss of consciousness.  She is on Eliquis for history of DVT.  In ER there was noted to be a large laceration to the medial aspect of the ankle. Orthopedics was consulted in the ER with plans to go to the OR on 2020 for open displaced fractures of distal shaft of the medial malleolus abd displaced fracture of distal fibula.   She also complains of weakness and decreased appetite times several days which is why her  is attempting to help her get off of the toilet. : Open reduction and internal fixation left bimalleolar ankle fracture and left ankle syndesmosis  : POD#1.  No complaints.  Pain well controlled. : POD #2. No complaints. Pain well controlled.  Patient expressed fear of falling when getting up with therapy.  Encouraged patient to work with therapy to prevent deconditioning. : POD #3. [id number] Jeni Horowitz for d/c per ortho with follow up. Increased wheezing. Will start PO steroids. Awaiting acceptance to [facility]. Stable for discharge. : POD#4. Patient doing well, no complaints. Still waiting for acceptance to [facility].  having issues finding facilities accepting admissions. Hospital day 4     SUBJECTIVE:  Stable overnight. No issues reported. Patient seen and examined  Records reviewed.        Temp (24hrs), Av.9 °F (36.6 °C), Min:97.2 °F (36.2 °C), Max:98.9 °F (37.2 °C)    DIET: DIET GENERAL;  CODE: Full Code    Intake/Output Summary (Last 24 hours) at 2020 0700  Last data filed at 2020 2154  Gross per 24 hour   Intake 180 ml   Output --   Net 180 ml       OBJECTIVE:    BP (!) 116/55   Pulse 85   Temp 97.2 °F (36.2 °C) (Temporal)   Resp 14   Ht 5' 2\" (1.575 m)   Wt 160 lb (72.6 kg)   SpO2 94%   BMI 29.26 kg/m²     General appearance: Elderly female in no apparent distress, appears stated age and cooperative. HEENT: Normal cephalic, atraumatic without obvious deformity. Pupils equal, round, and reactive to light.  Extra ocular muscles intact. Conjunctivae/corneas clear. Neck: Supple, with full range of motion. No jugular venous distention. Trachea midline. Respiratory:  Diffuse expiratory wheezing, improved from yesterday. No apparent distress. Cardiovascular:  Regular rate and rhythm. S1, S2 without murmurs, rubs, or gallops. PV: Brisk capillary refill.  +2 radial pulses bilaterally. 2+ right pedal pulse.  Unable to palpate left pedal pulse d/t dressing.  Left toes warm with brisk cap refill. No clubbing, cyanosis, edema of bilateral lower extremities. Abdomen: Soft, non-tender, non-distended. +BS  Musculoskeletal: No obvious deformities or erythematous or edematous joints. Limited ROM of the BUE R>L. Skin: Normal skin color.  No rashes or lesions. Dressing to LLE CDI. Ecchymosis and skin tears to the BUE. Neurologic:  Neurovascularly intact without any focal sensory/motor deficits.  Cranial nerves: II-XII intact, grossly non-focal.  Psychiatric: Alert and oriented, thought content appropriate, normal insight    Medications:  REVIEWED DAILY    Infusion Medications    dextrose       Scheduled Medications    predniSONE  40 mg Oral Daily    sodium chloride flush  10 mL Intravenous 2 times per day    metoprolol tartrate  6.25 mg Oral BID    apixaban  2.5 mg Oral BID    amLODIPine  5 mg Oral Daily    ipratropium-albuterol  1 ampule Inhalation Q4H WA    insulin lispro  0-12 Units Subcutaneous TID WC    insulin lispro  0-6 Units Subcutaneous Nightly    levothyroxine  88 mcg Oral Daily     PRN Meds: polyethylene glycol, promethazine **OR** ondansetron, ipratropium-albuterol, glucose, dextrose, glucagon (rDNA), dextrose, senna, sodium chloride flush, morphine **OR** morphine, benzocaine-menthol, traMADol **OR** traMADol, acetaminophen **OR** acetaminophen    Labs:     No results for input(s): WBC, HGB, HCT, PLT in the last 72 hours. No results for input(s): NA, K, CL, CO2, BUN, CREATININE, CALCIUM, PHOS in the last 72 hours. Invalid input(s): MAGNES    No results for input(s): PROT, ALB, ALKPHOS, ALT, AST, BILITOT, AMYLASE, LIPASE in the last 72 hours. No results for input(s): INR in the last 72 hours. No results for input(s): Charles Chuck in the last 72 hours. Chronic labs:    Lab Results   Component Value Date    TSH 1.620 11/22/2020    INR 1.2 11/21/2020    LABA1C 5.1 11/22/2020       Radiology: REVIEWED DAILY    ASSESSMENT/PLAN:    Grade 2 open left ankle fracture, bimalleolar status post open reduction and internal fixation.  POD#2  Ortho signed off, follow-up with Dr. Jose Marques in 2 weeks  PT/OT, NWB LLE  Bowel regimen  Hemoglobin stable  Not requiring PRN pain meds  Frequent falls  Generalized weakness  History of atrial fibrillation on chronic anticoagulation  Home Eliquis resumed  Stage IV CKD  Cr stable at 2.0. HTN, controlled  COPD  Duonebs Q4H while awake  Expiratory wheezing, improved  DB&C, IS, OOB for meals  Prednisone 40mg PO x 4 more days  CXR with patchy bibasilar infiltrates. Chronic hypoxic respiratory failure  2-3 L O2 dependent at baseline  DM type II, controlled  A1C 5.1  Borderline hypoglycemic yesterday, will scale down to LDSS  Anemia, macrocytic  Hb stable at 10.0  Add on B12 & folate  Diastolic dysfunction, grade 2  RBBB  Tobacco dependence  Hypothyroidism  Resume home synthroid  TSH 1.62     DISPOSITION: [facility]- d/c planning in progress.    +++++++++++++++++++++++++++++++++++++++++++++++++  Mervat Rolls, [address], [facility]  +++++++++++++++++++++++++++++++++++++++++++++++++  NOTE: This report was transcribed using voice recognition software.  Every effort was made to ensure accuracy; however, inadvertent computerized transcription errors may be present.

## 2020-11-26 NOTE — PROGRESS NOTES
Physical Therapy    Facility/Department: [address] NEURO SPINE  Initial Assessment    NAME: Mohini Dorsey  : 1940  MRN: [id number]    Date of Service: 2020    Patient Diagnosis(es): The primary encounter diagnosis was Type I or II open fracture of left ankle, initial encounter. Diagnoses of General weakness, Renal insufficiency, and Ankle fracture, left, open type I or II, initial encounter were also pertinent to this visit. has a past surgical history that includes Ankle fracture surgery (Left, 2020). Referring Provider:  Danielito Sierra DO     Evaluating PT:  Devorah Chinchilla PT, DPT [id number]     Room #:  4982/7665-V  Diagnosis:  Ankle fracture L, closed, initial encounter  PMHx/PSHx:  None on file  Procedure/Surgery:  2020  1.  Irrigation and debridement left grade 2 open ankle fracture including skin, subcutaneous tissue, muscle, and bone  2.  Open reduction internal fixation left bimalleolar ankle fracture  3.  Open reduction internal fixation left ankle syndesmosis  Precautions:  Falls, NWB LLE, poor skin integrity   Equipment Needs:  TBD     SUBJECTIVE:     Pt lives with  in a 3 story home with 3 stairs to enter and 1 rail. Bed is on first floor and bath is on first floor. Pt ambulated with front [employer] Luis F PTA. Pt reports she stays on first floor only. Pt owns [employer] but does not use it.         OBJECTIVE:    Initial Evaluation  Date: 2020 Treatment   Short Term/ Long Term   Goals   AM-PAC 6 Clicks 42/83 80/09     Was pt agreeable to Eval/treatment? yes yes     Does pt have pain? LLE soreness no     Bed Mobility  Rolling: Reji  Supine to sit: Reji  Sit to supine: Reji  Scooting: Reji Rolling:  SBA  Supine to sit:  Min A  Sit to supine:  Min A  Scooting:  Min A to sitting EOB Rolling: Independent  Supine to sit:  Independent  Sit to supine: Independent  Scooting: Independent   Transfers Sit to stand: NT, pt declined  Stand to sit: NT  Stand pivot: NT Sit to stand: Max A. Pt requested another person to stand by for safety. Sit to stand: Mod A  Sit to stand: Reji  Stand to sit: Reji  Stand pivot: Reji front [employer]   Ambulation    NT NT 10 feet with front [employer] Reji NWB LLE   Stair negotiation: ascended and descended  NT NT NA   ROM BUE:  Per OT note  BLE:  WNL       Strength BUE:  Per OT note  BLE:  WNL       Balance Sitting EOB:  SBA  Dynamic Standing:  NT  sitting EOB:  Supervision  Static standing:  Min A with w/w Sitting EOB:  Independent  Dynamic Standing:  Reji front [employer] with NWB LLE        Patient education  Pt educated on PT objectives during treatment session, hand placement during transfers. Patient response to education:   Pt verbalized understanding Pt demonstrated skill Pt requires further education in this area   Yes  With cuing yes     ASSESSMENT:    Comments:  Pt found and left in bed with call light in reach. Treatment:  Patient practiced and was instructed in the following treatment:    Functional mobility performed as documented above. No report of dizziness during functional mobility. Cueing required for hand placement during transfers. Once standing, pt able to maintain NWB left LE. Pt's standing tolerance is about one minute. PLAN:    Patient is making good progress towards established goals. Will continue with current POC.      Time in  0935  Time out  0954    Total Treatment Time  19 minutes     CPT codes:    [x] Therapeutic activities 46693 19minutes  [] Therapeutic exercises 77511  minutes      Jd Webber, [phone]

## 2020-11-27 LAB
ANION GAP SERPL CALCULATED.3IONS-SCNC: 9 MMOL/L (ref 7–16)
BASOPHILS ABSOLUTE: 0.01 E9/L (ref 0–0.2)
BASOPHILS RELATIVE PERCENT: 0.2 % (ref 0–2)
BUN BLDV-MCNC: 49 MG/DL (ref 8–23)
CALCIUM SERPL-MCNC: 8.8 MG/DL (ref 8.6–10.2)
CHLORIDE BLD-SCNC: 105 MMOL/L (ref 98–107)
CO2: 26 MMOL/L (ref 22–29)
CREAT SERPL-MCNC: 2 MG/DL (ref 0.5–1)
EKG ATRIAL RATE: 82 BPM
EKG P AXIS: 39 DEGREES
EKG P-R INTERVAL: 194 MS
EKG Q-T INTERVAL: 382 MS
EKG QRS DURATION: 142 MS
EKG QTC CALCULATION (BAZETT): 446 MS
EKG R AXIS: 21 DEGREES
EKG T AXIS: -4 DEGREES
EKG VENTRICULAR RATE: 82 BPM
EOSINOPHILS ABSOLUTE: 0 E9/L (ref 0.05–0.5)
EOSINOPHILS RELATIVE PERCENT: 0 % (ref 0–6)
GFR AFRICAN AMERICAN: 29
GFR NON-AFRICAN AMERICAN: 24 ML/MIN/1.73
GLUCOSE BLD-MCNC: 144 MG/DL (ref 74–99)
HCT VFR BLD CALC: 30 % (ref 34–48)
HEMOGLOBIN: 9.3 G/DL (ref 11.5–15.5)
IMMATURE GRANULOCYTES #: 0.05 E9/L
IMMATURE GRANULOCYTES %: 0.9 % (ref 0–5)
LYMPHOCYTES ABSOLUTE: 0.73 E9/L (ref 1.5–4)
LYMPHOCYTES RELATIVE PERCENT: 12.8 % (ref 20–42)
MCH RBC QN AUTO: 31.6 PG (ref 26–35)
MCHC RBC AUTO-ENTMCNC: 31 % (ref 32–34.5)
MCV RBC AUTO: 102 FL (ref 80–99.9)
METER GLUCOSE: 135 MG/DL (ref 74–99)
METER GLUCOSE: 152 MG/DL (ref 74–99)
METER GLUCOSE: 156 MG/DL (ref 74–99)
METER GLUCOSE: 165 MG/DL (ref 74–99)
MONOCYTES ABSOLUTE: 0.47 E9/L (ref 0.1–0.95)
MONOCYTES RELATIVE PERCENT: 8.2 % (ref 2–12)
NEUTROPHILS ABSOLUTE: 4.46 E9/L (ref 1.8–7.3)
NEUTROPHILS RELATIVE PERCENT: 77.9 % (ref 43–80)
PDW BLD-RTO: 16.4 FL (ref 11.5–15)
PLATELET # BLD: 188 E9/L (ref 130–450)
PMV BLD AUTO: 9.4 FL (ref 7–12)
POTASSIUM SERPL-SCNC: 4.8 MMOL/L (ref 3.5–5)
POTASSIUM SERPL-SCNC: 5.7 MMOL/L (ref 3.5–5)
PROCALCITONIN: 0.15 NG/ML (ref 0–0.08)
RBC # BLD: 2.94 E12/L (ref 3.5–5.5)
SODIUM BLD-SCNC: 140 MMOL/L (ref 132–146)
TROPONIN: <0.01 NG/ML (ref 0–0.03)
WBC # BLD: 5.7 E9/L (ref 4.5–11.5)

## 2020-11-27 PROCEDURE — 2580000003 HC RX 258: Performed by: STUDENT IN AN ORGANIZED HEALTH CARE EDUCATION/TRAINING PROGRAM

## 2020-11-27 PROCEDURE — 36415 COLL VENOUS BLD VENIPUNCTURE: CPT

## 2020-11-27 PROCEDURE — 80048 BASIC METABOLIC PNL TOTAL CA: CPT

## 2020-11-27 PROCEDURE — 82962 GLUCOSE BLOOD TEST: CPT

## 2020-11-27 PROCEDURE — 6370000000 HC RX 637 (ALT 250 FOR IP): Performed by: NURSE PRACTITIONER

## 2020-11-27 PROCEDURE — 94640 AIRWAY INHALATION TREATMENT: CPT

## 2020-11-27 PROCEDURE — 2580000003 HC RX 258: Performed by: NURSE PRACTITIONER

## 2020-11-27 PROCEDURE — 85025 COMPLETE CBC W/AUTO DIFF WBC: CPT

## 2020-11-27 PROCEDURE — 93005 ELECTROCARDIOGRAM TRACING: CPT | Performed by: NURSE PRACTITIONER

## 2020-11-27 PROCEDURE — 84484 ASSAY OF TROPONIN QUANT: CPT

## 2020-11-27 PROCEDURE — 6370000000 HC RX 637 (ALT 250 FOR IP): Performed by: STUDENT IN AN ORGANIZED HEALTH CARE EDUCATION/TRAINING PROGRAM

## 2020-11-27 PROCEDURE — 97530 THERAPEUTIC ACTIVITIES: CPT

## 2020-11-27 PROCEDURE — 1200000000 HC SEMI PRIVATE

## 2020-11-27 PROCEDURE — 93010 ELECTROCARDIOGRAM REPORT: CPT | Performed by: INTERNAL MEDICINE

## 2020-11-27 PROCEDURE — 84132 ASSAY OF SERUM POTASSIUM: CPT

## 2020-11-27 PROCEDURE — 2700000000 HC OXYGEN THERAPY PER DAY

## 2020-11-27 PROCEDURE — 6360000002 HC RX W HCPCS: Performed by: NURSE PRACTITIONER

## 2020-11-27 PROCEDURE — 84145 PROCALCITONIN (PCT): CPT

## 2020-11-27 RX ORDER — SODIUM POLYSTYRENE SULFONATE 15 G/60ML
15 SUSPENSION ORAL; RECTAL ONCE
Status: COMPLETED | OUTPATIENT
Start: 2020-11-27 | End: 2020-11-27

## 2020-11-27 RX ORDER — DOXYCYCLINE HYCLATE 100 MG/1
100 CAPSULE ORAL EVERY 12 HOURS SCHEDULED
Status: DISCONTINUED | OUTPATIENT
Start: 2020-11-27 | End: 2020-12-01 | Stop reason: HOSPADM

## 2020-11-27 RX ORDER — CEFDINIR 300 MG/1
300 CAPSULE ORAL EVERY 12 HOURS SCHEDULED
Status: DISCONTINUED | OUTPATIENT
Start: 2020-11-27 | End: 2020-12-01 | Stop reason: HOSPADM

## 2020-11-27 RX ADMIN — IPRATROPIUM BROMIDE AND ALBUTEROL SULFATE 1 AMPULE: .5; 3 SOLUTION RESPIRATORY (INHALATION) at 07:40

## 2020-11-27 RX ADMIN — INSULIN LISPRO 1 UNITS: 100 INJECTION, SOLUTION INTRAVENOUS; SUBCUTANEOUS at 20:39

## 2020-11-27 RX ADMIN — PREDNISONE 40 MG: 20 TABLET ORAL at 10:21

## 2020-11-27 RX ADMIN — AMLODIPINE BESYLATE 5 MG: 5 TABLET ORAL at 10:16

## 2020-11-27 RX ADMIN — INSULIN LISPRO 2 UNITS: 100 INJECTION, SOLUTION INTRAVENOUS; SUBCUTANEOUS at 12:21

## 2020-11-27 RX ADMIN — CEFTRIAXONE SODIUM 1 G: 1 INJECTION, POWDER, FOR SOLUTION INTRAMUSCULAR; INTRAVENOUS at 12:16

## 2020-11-27 RX ADMIN — LEVOTHYROXINE SODIUM 88 MCG: 0.09 TABLET ORAL at 06:33

## 2020-11-27 RX ADMIN — Medication 10 ML: at 20:39

## 2020-11-27 RX ADMIN — SODIUM POLYSTYRENE SULFONATE 15 G: 15 SUSPENSION ORAL; RECTAL at 10:21

## 2020-11-27 RX ADMIN — DOXYCYCLINE HYCLATE 100 MG: 100 CAPSULE ORAL at 10:19

## 2020-11-27 RX ADMIN — DOXYCYCLINE HYCLATE 100 MG: 100 CAPSULE ORAL at 20:38

## 2020-11-27 RX ADMIN — IPRATROPIUM BROMIDE AND ALBUTEROL SULFATE 1 AMPULE: .5; 3 SOLUTION RESPIRATORY (INHALATION) at 12:29

## 2020-11-27 RX ADMIN — IPRATROPIUM BROMIDE AND ALBUTEROL SULFATE 1 AMPULE: .5; 3 SOLUTION RESPIRATORY (INHALATION) at 17:58

## 2020-11-27 RX ADMIN — FOLIC ACID 1 MG: 1 TABLET ORAL at 10:19

## 2020-11-27 RX ADMIN — APIXABAN 2.5 MG: 2.5 TABLET, FILM COATED ORAL at 20:38

## 2020-11-27 RX ADMIN — METOPROLOL TARTRATE 6.25 MG: 25 TABLET, FILM COATED ORAL at 10:20

## 2020-11-27 RX ADMIN — CEFDINIR 300 MG: 300 CAPSULE ORAL at 20:38

## 2020-11-27 RX ADMIN — APIXABAN 2.5 MG: 2.5 TABLET, FILM COATED ORAL at 10:18

## 2020-11-27 RX ADMIN — SENNOSIDES 8.6 MG: 8.6 TABLET, FILM COATED ORAL at 10:34

## 2020-11-27 RX ADMIN — Medication 10 ML: at 12:16

## 2020-11-27 RX ADMIN — INSULIN LISPRO 2 UNITS: 100 INJECTION, SOLUTION INTRAVENOUS; SUBCUTANEOUS at 17:34

## 2020-11-27 ASSESSMENT — PAIN SCALES - GENERAL: PAINLEVEL_OUTOF10: 0

## 2020-11-27 NOTE — PROGRESS NOTES
Call placed Leigh Yarbrough, , regarding patient transfer to [facility].   Electronically signed by Narendra Latif RN on 11/27/2020 at 1:16 PM

## 2020-11-27 NOTE — CARE COORDINATION
11/27 Update CM Note: Call placed to Sj Beltre at [facility] they are not taking patients currently. [facility] is not taking patients until further notice. Attempting to reach out to patient for further choices. Spoke with patient and she has no other choices currently. She wants to stay close to [facility]. Referrals placed via phone to [facility], and [facility]. Also call placed to [address] [place] [address] who does not take patients insurance. Will await return calls. Patient states home is not an option and patients  is 80 and cannot help her at home.  Estela Buenrostro RN CM

## 2020-11-27 NOTE — CARE COORDINATION
11/27 Update CM Note: [facility] not taking patients at this time. Spoke again with the patient who states she is not able to return home and she does not have any other choices as she wants to remain in or near [facility]. I have researched and have not been able to find a facility to accept patient due to Covid. Patient is to make additional choices for rehab and notify SW/CM.  Taylor Lopez RN Cm

## 2020-11-27 NOTE — PROGRESS NOTES
Physical Therapy    NAME: Joni Taylor  : 1940  MRN: [id number]     Date of Service: 2020     Patient Diagnosis(es): The primary encounter diagnosis was Type I or II open fracture of left ankle, initial encounter. Diagnoses of General weakness, Renal insufficiency, and Ankle fracture, left, open type I or II, initial encounter were also pertinent to this visit.      has a past surgical history that includes Ankle fracture surgery (Left, 2020).    Referring Provider: Kajal Holland DO     Evaluating PT: Zenaida Huertas, PT, [place] [id number]     0512/0161-F  Diagnosis:  Ankle fracture L, closed, initial encounter  PMHx/PSHx:  None on file  Procedure/Surgery:    1.  Irrigation and debridement left grade 2 open ankle fracture including skin, subcutaneous tissue, muscle, and bone  2.  Open reduction internal fixation left bimalleolar ankle fracture  3.  Open reduction internal fixation left ankle syndesmosis  Precautions:  Falls, NWB LLE, poor skin integrity, Advanced DJD RUE shoulder. Equipment Needs:  TBD     SUBJECTIVE:     Pt lives with  in a 3 story home with 3 stairs to enter and 1 rail.  Bed is on first floor and bath is on first floor.  Pt ambulated with front [employer] PTA.  Pt reports she stays on first floor only.  Pt owns [facility] but does not use it.         OBJECTIVE:    Initial Evaluation  Date: 2020 Treatment   Short Term/ Long Term   Goals   AM-PAC 6 Clicks 31 54/51     Was pt agreeable to Eval/treatment? yes yes     Does pt have pain? LLE soreness no     Bed Mobility  Rolling: Reji  Supine to sit: Reji  Sit to supine: Reji  Scooting: Reji Rolling:  SBA  Supine to sit:  Min A  Sit to supine:  Min A  Scooting:  Min A to sitting EOB Rolling: Independent  Supine to sit: Independent  Sit to supine: Independent  Scooting: Independent   Transfers Sit to stand: NT, pt declined  Stand to sit: NT  Stand pivot: NT Sit to stand: Max A.   R foot blocked from sliding. Sit to stand: Reji  Stand to sit: Reji  Stand pivot: Reji front [employer]   Ambulation    NT NT 10 feet with front [employer] Reji NWB LLE   Stair negotiation: ascended and descended  NT NT NA   ROM BUE:  Per OT note  BLE:  WNL       Strength BUE:  Per OT note  BLE:  WNL       Balance Sitting EOB:  SBA  Dynamic Standing:  NT  sitting EOB:  Supervision  Static standing:  Min A with w/w Sitting EOB:  Independent  Dynamic Standing:  Reji front [employer] with NWB LLE         Patient education  Pt educated on PT objectives during treatment session, hand placement during transfers.      Patient response to education:   Pt verbalized understanding Pt demonstrated skill Pt requires further education in this area   Yes  With cuing yes      ASSESSMENT:     Comments:  Pt found and left in bed with call light in reach.       Treatment:  Patient practiced and was instructed in the following treatment:   · Functional mobility performed as documented above. No report of dizziness during functional mobility. Cueing required for hand placement during transfers. Once standing, pt able to maintain NWB left LE. Pt's standing tolerance is about one minute.          PLAN:     Patient is making good progress towards established goals. Will continue with current POC.      Time in  1430  Time out  1455     Total Treatment Time 25 minutes      CPT codes:     [x]? Therapeutic activities 51088 25 minutes  []?  Therapeutic exercises 97766  minutes     Diana Dillons, [address] [facility]

## 2020-11-27 NOTE — PROGRESS NOTES
Occupational Therapy  OT BEDSIDE TREATMENT NOTE      Date:2020  Patient Name: Kay Juarez  MRN: [id number]  : 1940  Room: [facility]       Pt's chart reviewed and treatment attempted, pt declined at this time due to just eating and not having BM yet. Educated pt on importance of movement and sitting upright to assist in bowel function. Will attempt at a later time/date.             Naye Pearce

## 2020-11-27 NOTE — PROGRESS NOTES
Nurse to nurse report given to [facility]WE. Spoke to Barrie Kelly.   Electronically signed by Kaley Hamilton RN on 11/27/2020 at 11:46 AM

## 2020-11-27 NOTE — PROGRESS NOTES
Hospitalist Progress Note      SYNOPSIS: Patient admitted on 2020 for a left ankle injury beginning after a fall while her  was trying to assist her up from the toilet.  She admits to bumping her head and her bottom but reports no pain aside from her left ankle.  She denies any loss of consciousness.  She is on Eliquis for history of DVT.  In ER there was noted to be a large laceration to the medial aspect of the ankle. Orthopedics was consulted in the ER with plans to go to the OR on 2020 for open displaced fractures of distal shaft of the medial malleolus abd displaced fracture of distal fibula.   She also complains of weakness and decreased appetite times several days which is why her  is attempting to help her get off of the toilet. : Open reduction and internal fixation left bimalleolar ankle fracture and left ankle syndesmosis  : POD#1.  No complaints.  Pain well controlled. : POD #2. No complaints. Pain well controlled.  Patient expressed fear of falling when getting up with therapy.  Encouraged patient to work with therapy to prevent deconditioning. : POD #3.  Ok for d/c per ortho with follow up. Increased wheezing.  Will start PO steroids.  Awaiting acceptance to [facility] [place]. Stable for discharge. : POD#4. Patient doing well, no complaints. Still waiting for acceptance to [facility] [place]. CM having issues finding facilities accepting admissions. : POD #5. Increasing O2 requirements. Cxr with bibasilar infiltrates. Procal 0.15. Moist cough. Patient feeling well. Upset that none of the SARs she wanted to go to are accepting patients. Hospital day 5     SUBJECTIVE:  Stable overnight. No issues reported. Patient seen and examined  Records reviewed.        Temp (24hrs), Av.7 °F (36.5 °C), Min:97.4 °F (36.3 °C), Max:98 °F (36.7 °C)    DIET: DIET GENERAL;  CODE: Full Code    Intake/Output Summary (Last 24 hours) at 2020 9376  Last data filed at 11/27/2020 0510  Gross per 24 hour   Intake 960 ml   Output 1250 ml   Net -290 ml       OBJECTIVE:    BP (!) 98/50   Pulse 81   Temp 97.8 °F (36.6 °C) (Temporal)   Resp 18   Ht 5' 2\" (1.575 m)   Wt 160 lb (72.6 kg)   SpO2 93%   BMI 29.26 kg/m²     General appearance: Elderly female in no apparent distress, appears stated age and cooperative. HEENT: Normal cephalic, atraumatic without obvious deformity. Pupils equal, round, and reactive to light.  Extra ocular muscles intact. Conjunctivae/corneas clear. Neck: Supple, with full range of motion. No jugular venous distention. Trachea midline. Respiratory:  Clear throughout though diminished in the bilateral bases. No apparent distress. Cardiovascular:  Regular rate and rhythm. S1, S2 without murmurs, rubs, or gallops. PV: Brisk capillary refill.  +2 radial pulses bilaterally. 2+ right pedal pulse.  Unable to palpate left pedal pulse d/t dressing.  Left toes warm with brisk cap refill. No clubbing, cyanosis, edema of bilateral lower extremities. Abdomen: Soft, non-tender, non-distended. +BS  Musculoskeletal: No obvious deformities or erythematous or edematous joints. Limited ROM of the BUE R>L. Skin: Normal skin color.  No rashes or lesions. Dressing to LLE CDI. Ecchymosis and skin tears to the BUE. Neurologic:  Neurovascularly intact without any focal sensory/motor deficits.  Cranial nerves: II-XII intact, grossly non-focal.  Psychiatric: Alert and oriented, thought content appropriate, normal insight    Medications:  REVIEWED DAILY    Infusion Medications    dextrose       Scheduled Medications    insulin lispro  0-6 Units Subcutaneous TID     insulin lispro  0-3 Units Subcutaneous Nightly    folic acid  1 mg Oral Daily    predniSONE  40 mg Oral Daily    sodium chloride flush  10 mL Intravenous 2 times per day    metoprolol tartrate  6.25 mg Oral BID    apixaban  2.5 mg Oral BID    amLODIPine  5 mg Oral Daily    ipratropium-albuterol  1 1.62     DISPOSITION: DONALD- d/c planning in progress    +++++++++++++++++++++++++++++++++++++++++++++++++  FERMIN Bustos/ Eric [address]  +++++++++++++++++++++++++++++++++++++++++++++++++  NOTE: This report was transcribed using voice recognition software. Every effort was made to ensure accuracy; however, inadvertent computerized transcription errors may be present.

## 2020-11-28 ENCOUNTER — APPOINTMENT (OUTPATIENT)
Dept: ULTRASOUND IMAGING | Age: 80
DRG: 492 | End: 2020-11-28
Payer: MEDICARE

## 2020-11-28 PROBLEM — J18.9 PNEUMONIA: Status: ACTIVE | Noted: 2020-11-28

## 2020-11-28 PROBLEM — R60.0 EDEMA OF RIGHT UPPER EXTREMITY: Status: ACTIVE | Noted: 2020-11-28

## 2020-11-28 LAB
L. PNEUMOPHILA SEROGP 1 UR AG: NORMAL
METER GLUCOSE: 132 MG/DL (ref 74–99)
METER GLUCOSE: 160 MG/DL (ref 74–99)
METER GLUCOSE: 243 MG/DL (ref 74–99)
METER GLUCOSE: 255 MG/DL (ref 74–99)
OSMOLALITY URINE: 378 MOSM/KG (ref 300–900)
POTASSIUM, UR: 33.6 MMOL/L
STREP PNEUMONIAE ANTIGEN, URINE: NORMAL

## 2020-11-28 PROCEDURE — 93971 EXTREMITY STUDY: CPT

## 2020-11-28 PROCEDURE — 94640 AIRWAY INHALATION TREATMENT: CPT

## 2020-11-28 PROCEDURE — 2700000000 HC OXYGEN THERAPY PER DAY

## 2020-11-28 PROCEDURE — 6370000000 HC RX 637 (ALT 250 FOR IP): Performed by: STUDENT IN AN ORGANIZED HEALTH CARE EDUCATION/TRAINING PROGRAM

## 2020-11-28 PROCEDURE — 83935 ASSAY OF URINE OSMOLALITY: CPT

## 2020-11-28 PROCEDURE — 81001 URINALYSIS AUTO W/SCOPE: CPT

## 2020-11-28 PROCEDURE — 1200000000 HC SEMI PRIVATE

## 2020-11-28 PROCEDURE — 87449 NOS EACH ORGANISM AG IA: CPT

## 2020-11-28 PROCEDURE — 6370000000 HC RX 637 (ALT 250 FOR IP): Performed by: NURSE PRACTITIONER

## 2020-11-28 PROCEDURE — 82962 GLUCOSE BLOOD TEST: CPT

## 2020-11-28 PROCEDURE — 93971 EXTREMITY STUDY: CPT | Performed by: RADIOLOGY

## 2020-11-28 PROCEDURE — 84133 ASSAY OF URINE POTASSIUM: CPT

## 2020-11-28 PROCEDURE — 2580000003 HC RX 258: Performed by: STUDENT IN AN ORGANIZED HEALTH CARE EDUCATION/TRAINING PROGRAM

## 2020-11-28 RX ADMIN — INSULIN LISPRO 2 UNITS: 100 INJECTION, SOLUTION INTRAVENOUS; SUBCUTANEOUS at 09:16

## 2020-11-28 RX ADMIN — PREDNISONE 40 MG: 20 TABLET ORAL at 09:10

## 2020-11-28 RX ADMIN — DOXYCYCLINE HYCLATE 100 MG: 100 CAPSULE ORAL at 09:10

## 2020-11-28 RX ADMIN — IPRATROPIUM BROMIDE AND ALBUTEROL SULFATE 1 AMPULE: .5; 3 SOLUTION RESPIRATORY (INHALATION) at 09:52

## 2020-11-28 RX ADMIN — IPRATROPIUM BROMIDE AND ALBUTEROL SULFATE 1 AMPULE: .5; 3 SOLUTION RESPIRATORY (INHALATION) at 12:26

## 2020-11-28 RX ADMIN — LEVOTHYROXINE SODIUM 88 MCG: 0.09 TABLET ORAL at 06:41

## 2020-11-28 RX ADMIN — FOLIC ACID 1 MG: 1 TABLET ORAL at 09:11

## 2020-11-28 RX ADMIN — Medication 10 ML: at 09:14

## 2020-11-28 RX ADMIN — CEFDINIR 300 MG: 300 CAPSULE ORAL at 20:53

## 2020-11-28 RX ADMIN — DOXYCYCLINE HYCLATE 100 MG: 100 CAPSULE ORAL at 20:52

## 2020-11-28 RX ADMIN — Medication 10 ML: at 20:53

## 2020-11-28 RX ADMIN — INSULIN LISPRO 3 UNITS: 100 INJECTION, SOLUTION INTRAVENOUS; SUBCUTANEOUS at 20:54

## 2020-11-28 RX ADMIN — APIXABAN 2.5 MG: 2.5 TABLET, FILM COATED ORAL at 09:11

## 2020-11-28 RX ADMIN — METOPROLOL TARTRATE 6.25 MG: 25 TABLET, FILM COATED ORAL at 20:52

## 2020-11-28 RX ADMIN — INSULIN LISPRO 4 UNITS: 100 INJECTION, SOLUTION INTRAVENOUS; SUBCUTANEOUS at 17:33

## 2020-11-28 RX ADMIN — APIXABAN 2.5 MG: 2.5 TABLET, FILM COATED ORAL at 20:53

## 2020-11-28 RX ADMIN — METOPROLOL TARTRATE 6.25 MG: 25 TABLET, FILM COATED ORAL at 09:11

## 2020-11-28 RX ADMIN — AMLODIPINE BESYLATE 5 MG: 5 TABLET ORAL at 09:10

## 2020-11-28 RX ADMIN — CEFDINIR 300 MG: 300 CAPSULE ORAL at 09:10

## 2020-11-28 ASSESSMENT — PAIN SCALES - GENERAL
PAINLEVEL_OUTOF10: 0

## 2020-11-28 NOTE — PROGRESS NOTES
Hospitalist Progress Note    SYNOPSIS: Patient admitted on 2020 for a left ankle injury beginning after a fall while her  was trying to assist her up from the toilet.  She admits to bumping her head and her bottom but reports no pain aside from her left ankle.  She denies any loss of consciousness.  She is on Eliquis for history of DVT.  In ER there was noted to be a large laceration to the medial aspect of the ankle. Orthopedics was consulted in the ER with plans to go to the OR on 2020 for open displaced fractures of distal shaft of the medial malleolus abd displaced fracture of distal fibula.   She also complains of weakness and decreased appetite times several days which is why her  is attempting to help her get off of the toilet. : Open reduction and internal fixation left bimalleolar ankle fracture and left ankle syndesmosis  : POD#1.  No complaints.  Pain well controlled. : POD #2. No complaints. Pain well controlled.  Patient expressed fear of falling when getting up with therapy.  Encouraged patient to work with therapy to prevent deconditioning. : POD #3.  Ok for d/c per ortho with follow up. Increased wheezing.  Will start PO steroids.  Awaiting acceptance to [facility]. Stable for discharge.   : POD#4. Patient doing well, no complaints. Still waiting for acceptance to [facility]. CM having issues finding facilities accepting admissions. : POD #5. Increasing O2 requirements. Cxr with bibasilar infiltrates. Procal 0.15. Moist cough. Patient feeling well. Upset that none of the SARs she wanted to go to are accepting patients. : Still requiring 4L NC, though SpO2 97%. Could probably wean down to baseline. RUE swelling without erythema or warmth. Will check US. Hospital day 6     SUBJECTIVE:  Stable overnight. No issues reported. Patient seen and examined  Records reviewed.        Temp (24hrs), Av.9 °F (36.6 °C), Min:97.6 °F (36.4 °C), Max:98.4 °F (36.9 °C)    DIET: DIET GENERAL; Carb Control: 4 carb choices (60 gms)/meal; Low Potassium  CODE: Full Code    Intake/Output Summary (Last 24 hours) at 11/28/2020 0905  Last data filed at 11/28/2020 7110  Gross per 24 hour   Intake 710 ml   Output 1200 ml   Net -490 ml       OBJECTIVE:    BP (!) 108/53   Pulse 75   Temp 97.9 °F (36.6 °C) (Temporal)   Resp 16   Ht 5' 2\" (1.575 m)   Wt 160 lb (72.6 kg)   SpO2 97%   BMI 29.26 kg/m²     General appearance: Elderly female in no apparent distress, appears stated age and cooperative. HEENT: Normal cephalic, atraumatic without obvious deformity. Pupils equal, round, and reactive to light.  Extra ocular muscles intact. Conjunctivae/corneas clear. Neck: Supple, with full range of motion. No jugular venous distention. Trachea midline. Respiratory:  Clear throughout though diminished in the bilateral bases. No apparent distress. Cardiovascular:  Regular rate and rhythm. S1, S2 without murmurs, rubs, or gallops. PV: Brisk capillary refill.  +2 radial pulses bilaterally. 2+ right pedal pulse.  Unable to palpate left pedal pulse d/t dressing.  Left toes warm with brisk cap refill. No clubbing, cyanosis, edema of bilateral lower extremities. 1+ pitting edema of the RUE. No erythema or warmth. Abdomen: Soft, non-tender, non-distended. +BS  Musculoskeletal: No obvious deformities or erythematous or edematous joints. Limited ROM of the BUE R>L. Skin: Normal skin color. Dressing to LLE CDI. Ecchymosis and skin tears to the BUE. Neurologic:  Neurovascularly intact without any focal sensory/motor deficits.  Cranial nerves: II-XII intact, grossly non-focal.  Psychiatric: Alert and oriented, thought content appropriate, normal insightt    Medications:  REVIEWED DAILY    Infusion Medications    dextrose       Scheduled Medications    insulin lispro  0-12 Units Subcutaneous TID WC    insulin lispro  0-6 Units Subcutaneous Nightly    doxycycline hyclate  100 mg Oral 2 times per day    cefdinir  300 mg Oral 2 times per day    folic acid  1 mg Oral Daily    predniSONE  40 mg Oral Daily    sodium chloride flush  10 mL Intravenous 2 times per day    metoprolol tartrate  6.25 mg Oral BID    apixaban  2.5 mg Oral BID    amLODIPine  5 mg Oral Daily    ipratropium-albuterol  1 ampule Inhalation Q4H WA    levothyroxine  88 mcg Oral Daily     PRN Meds: polyethylene glycol, promethazine **OR** ondansetron, ipratropium-albuterol, glucose, dextrose, glucagon (rDNA), dextrose, senna, sodium chloride flush, morphine **OR** morphine, benzocaine-menthol, traMADol **OR** traMADol, acetaminophen **OR** acetaminophen    Labs:     Recent Labs     11/26/20  0854 11/27/20  0928   WBC 5.7 5.7   HGB 10.0* 9.3*   HCT 31.5* 30.0*    188       Recent Labs     11/26/20  0854 11/27/20  0629 11/27/20  1205    140  --    K 5.2* 5.7* 4.8    105  --    CO2 22 26  --    BUN 43* 49*  --    CREATININE 2.0* 2.0*  --    CALCIUM 8.9 8.8  --        No results for input(s): PROT, ALB, ALKPHOS, ALT, AST, BILITOT, AMYLASE, LIPASE in the last 72 hours. No results for input(s): INR in the last 72 hours. Recent Labs     11/27/20  1205   TROPONINI <0.01       Chronic labs:    Lab Results   Component Value Date    TSH 1.620 11/22/2020    INR 1.2 11/21/2020    LABA1C 5.1 11/22/2020       Radiology: REVIEWED DAILY    Pneumonia  Requiring 4L O2, baseline 2-3L  CXR with infiltrates, increasing O2 requirements, moist cough. Doxy and omnicef PO  Urine strep & Leg negative. Respiratory culture not collected, moist but non-productive cough. C&DB, IS, OOB for meals.    RUE edema  Check US  Grade 2 open left ankle fracture, bimalleolar status post open reduction and internal fixation.    Ortho signed off, follow-up with Dr. Marii Taveras in 2 weeks  PT/OT, NWB LLE  Bowel regimen  Hemoglobin stable  No pain  Hyperkalemia, resolved  K 4.8 s/p Kayexalate  Will recheck today  Frequent falls  Generalized weakness  PT/OT  History of atrial fibrillation on chronic anticoagulation  Home Eliquis resumed  Stage IV CKD, stable  HTN, controlled  COPD  Duonebs Q4H while awake  DB&C, IS, OOB for meals  Prednisone 40mg PO x 2 more days  Chronic hypoxic respiratory failure  2-3 L O2 dependent at baseline  DM type II, controlled  A1C 5.1  MDSS  Anemia, macrocytic  Hb stable at 9.3  B12 WNL  Folate 3.1, daily supplementation. Diastolic dysfunction, grade 2  RBBB  Tobacco dependence  Hypothyroidism  Resume home synthroid  TSH 1.62     DISPOSITION: DONALD- d/c planning in progress    +++++++++++++++++++++++++++++++++++++++++++++++++  KIM Dominguez Physician - [address] [place] [address]  +++++++++++++++++++++++++++++++++++++++++++++++++  NOTE: This report was transcribed using voice recognition software. Every effort was made to ensure accuracy; however, inadvertent computerized transcription errors may be present.

## 2020-11-28 NOTE — PROGRESS NOTES
Date: 2020       Patient Name: Jass Byrd  : 1940      MRN: [id number]    Pt declining PT services at this time due to RUE pain. Pt has [facility] [address] ordered to rule out DVT.   Will follow up as appropriate    Lauro Swain PT, DPT  [id number]

## 2020-11-28 NOTE — PLAN OF CARE
Problem: Pain:  Goal: Pain level will decrease  Description: Pain level will decrease  11/28/2020 0139 by Vermell Shone, RN  Outcome: Met This Shift     Problem: Pain:  Goal: Control of acute pain  Description: Control of acute pain  11/28/2020 0139 by Vermell Shone, RN  Outcome: Met This Shift

## 2020-11-29 ENCOUNTER — APPOINTMENT (OUTPATIENT)
Dept: GENERAL RADIOLOGY | Age: 80
DRG: 492 | End: 2020-11-29
Payer: MEDICARE

## 2020-11-29 LAB
AMORPHOUS: ABNORMAL
BACTERIA: ABNORMAL /HPF
BILIRUBIN URINE: NEGATIVE
BLOOD, URINE: ABNORMAL
CLARITY: CLEAR
COLOR: YELLOW
GLUCOSE URINE: 250 MG/DL
KETONES, URINE: NEGATIVE MG/DL
LEUKOCYTE ESTERASE, URINE: ABNORMAL
METER GLUCOSE: 112 MG/DL (ref 74–99)
METER GLUCOSE: 229 MG/DL (ref 74–99)
METER GLUCOSE: 260 MG/DL (ref 74–99)
NITRITE, URINE: NEGATIVE
PH UA: 5.5 (ref 5–9)
PROTEIN UA: 100 MG/DL
RBC UA: ABNORMAL /HPF (ref 0–2)
SARS-COV-2, NAAT: NOT DETECTED
SPECIFIC GRAVITY UA: 1.02 (ref 1–1.03)
UROBILINOGEN, URINE: 0.2 E.U./DL
WBC UA: ABNORMAL /HPF (ref 0–5)

## 2020-11-29 PROCEDURE — 99223 1ST HOSP IP/OBS HIGH 75: CPT | Performed by: INTERNAL MEDICINE

## 2020-11-29 PROCEDURE — U0002 COVID-19 LAB TEST NON-CDC: HCPCS

## 2020-11-29 PROCEDURE — 1200000000 HC SEMI PRIVATE

## 2020-11-29 PROCEDURE — 2580000003 HC RX 258: Performed by: STUDENT IN AN ORGANIZED HEALTH CARE EDUCATION/TRAINING PROGRAM

## 2020-11-29 PROCEDURE — 71046 X-RAY EXAM CHEST 2 VIEWS: CPT

## 2020-11-29 PROCEDURE — 6370000000 HC RX 637 (ALT 250 FOR IP): Performed by: NURSE PRACTITIONER

## 2020-11-29 PROCEDURE — 82962 GLUCOSE BLOOD TEST: CPT

## 2020-11-29 PROCEDURE — 94640 AIRWAY INHALATION TREATMENT: CPT

## 2020-11-29 PROCEDURE — 6370000000 HC RX 637 (ALT 250 FOR IP): Performed by: STUDENT IN AN ORGANIZED HEALTH CARE EDUCATION/TRAINING PROGRAM

## 2020-11-29 PROCEDURE — 2700000000 HC OXYGEN THERAPY PER DAY

## 2020-11-29 RX ADMIN — IPRATROPIUM BROMIDE AND ALBUTEROL SULFATE 1 AMPULE: .5; 3 SOLUTION RESPIRATORY (INHALATION) at 09:53

## 2020-11-29 RX ADMIN — APIXABAN 2.5 MG: 2.5 TABLET, FILM COATED ORAL at 08:48

## 2020-11-29 RX ADMIN — CEFDINIR 300 MG: 300 CAPSULE ORAL at 08:49

## 2020-11-29 RX ADMIN — FOLIC ACID 1 MG: 1 TABLET ORAL at 08:49

## 2020-11-29 RX ADMIN — METOPROLOL TARTRATE 6.25 MG: 25 TABLET, FILM COATED ORAL at 08:46

## 2020-11-29 RX ADMIN — INSULIN LISPRO 6 UNITS: 100 INJECTION, SOLUTION INTRAVENOUS; SUBCUTANEOUS at 08:49

## 2020-11-29 RX ADMIN — DOXYCYCLINE HYCLATE 100 MG: 100 CAPSULE ORAL at 21:59

## 2020-11-29 RX ADMIN — Medication 10 ML: at 08:49

## 2020-11-29 RX ADMIN — IPRATROPIUM BROMIDE AND ALBUTEROL SULFATE 1 AMPULE: .5; 3 SOLUTION RESPIRATORY (INHALATION) at 17:30

## 2020-11-29 RX ADMIN — DOXYCYCLINE HYCLATE 100 MG: 100 CAPSULE ORAL at 08:47

## 2020-11-29 RX ADMIN — INSULIN LISPRO 4 UNITS: 100 INJECTION, SOLUTION INTRAVENOUS; SUBCUTANEOUS at 17:10

## 2020-11-29 RX ADMIN — PREDNISONE 40 MG: 20 TABLET ORAL at 08:47

## 2020-11-29 RX ADMIN — IPRATROPIUM BROMIDE AND ALBUTEROL SULFATE 1 AMPULE: .5; 3 SOLUTION RESPIRATORY (INHALATION) at 12:04

## 2020-11-29 RX ADMIN — INSULIN LISPRO 3 UNITS: 100 INJECTION, SOLUTION INTRAVENOUS; SUBCUTANEOUS at 22:17

## 2020-11-29 RX ADMIN — LEVOTHYROXINE SODIUM 88 MCG: 0.09 TABLET ORAL at 06:49

## 2020-11-29 RX ADMIN — IPRATROPIUM BROMIDE AND ALBUTEROL SULFATE 1 AMPULE: .5; 3 SOLUTION RESPIRATORY (INHALATION) at 20:20

## 2020-11-29 RX ADMIN — METOPROLOL TARTRATE 6.25 MG: 25 TABLET, FILM COATED ORAL at 21:58

## 2020-11-29 RX ADMIN — AMLODIPINE BESYLATE 5 MG: 5 TABLET ORAL at 08:47

## 2020-11-29 RX ADMIN — APIXABAN 2.5 MG: 2.5 TABLET, FILM COATED ORAL at 21:58

## 2020-11-29 RX ADMIN — Medication 10 ML: at 22:00

## 2020-11-29 RX ADMIN — CEFDINIR 300 MG: 300 CAPSULE ORAL at 21:59

## 2020-11-29 ASSESSMENT — PAIN SCALES - GENERAL
PAINLEVEL_OUTOF10: 0

## 2020-11-29 NOTE — PROGRESS NOTES
Hospitalist Progress Note      SYNOPSIS: Patient admitted on 11/21/2020 for a left ankle injury beginning after a fall while her  was trying to assist her up from the toilet.  She admits to bumping her head and her bottom but reports no pain aside from her left ankle.  She denies any loss of consciousness.  She is on Eliquis for history of DVT.  In ER there was noted to be a large laceration to the medial aspect of the ankle. Orthopedics was consulted in the ER with plans to go to the OR on 11/22/2020 for open displaced fractures of distal shaft of the medial malleolus abd displaced fracture of distal fibula.   She also complains of weakness and decreased appetite times several days which is why her  is attempting to help her get off of the toilet. 11/22: Open reduction and internal fixation left bimalleolar ankle fracture and left ankle syndesmosis  11/23: POD#1.  No complaints.  Pain well controlled. 11/24: POD #2. No complaints. Pain well controlled.  Patient expressed fear of falling when getting up with therapy.  Encouraged patient to work with therapy to prevent deconditioning. 11/25: POD #3.  Ok for d/c per ortho with follow up. Increased wheezing.  Will start PO steroids.  Awaiting acceptance to [facility]. Stable for discharge.   11/26: POD#4. Patient doing well, no complaints. Still waiting for acceptance to [facility]. CM having issues finding facilities accepting admissions. 11/27: POD #5. Increasing O2 requirements. Cxr with bibasilar infiltrates.  Procal 0.15. Moist cough. Patient feeling well.  Upset that none of the SARs she wanted to go to are accepting patients. 11/28: Still requiring 4L NC, though SpO2 97%. Could probably wean down to baseline. RUE swelling without erythema or warmth. Will check US.   11/29: No evidence of DVT on US. Requiring 4L O2, SpO2 94%. Medically stable for discharge. Awaiting [facility] acceptance. Hospital day 7     SUBJECTIVE:  Stable overnight.   No issues  dextrose       Scheduled Medications    insulin lispro  0-12 Units Subcutaneous TID     insulin lispro  0-6 Units Subcutaneous Nightly    doxycycline hyclate  100 mg Oral 2 times per day    cefdinir  300 mg Oral 2 times per day    folic acid  1 mg Oral Daily    predniSONE  40 mg Oral Daily    sodium chloride flush  10 mL Intravenous 2 times per day    metoprolol tartrate  6.25 mg Oral BID    apixaban  2.5 mg Oral BID    amLODIPine  5 mg Oral Daily    ipratropium-albuterol  1 ampule Inhalation Q4H WA    levothyroxine  88 mcg Oral Daily     PRN Meds: polyethylene glycol, promethazine **OR** ondansetron, ipratropium-albuterol, glucose, dextrose, glucagon (rDNA), dextrose, senna, sodium chloride flush, morphine **OR** morphine, benzocaine-menthol, traMADol **OR** traMADol, acetaminophen **OR** acetaminophen    Labs:     Recent Labs     11/26/20  0854 11/27/20  0928   WBC 5.7 5.7   HGB 10.0* 9.3*   HCT 31.5* 30.0*    188       Recent Labs     11/26/20  0854 11/27/20  0629 11/27/20  1205    140  --    K 5.2* 5.7* 4.8    105  --    CO2 22 26  --    BUN 43* 49*  --    CREATININE 2.0* 2.0*  --    CALCIUM 8.9 8.8  --        No results for input(s): PROT, ALB, ALKPHOS, ALT, AST, BILITOT, AMYLASE, LIPASE in the last 72 hours. No results for input(s): INR in the last 72 hours. Recent Labs     11/27/20  1205   TROPONINI <0.01       Chronic labs:    Lab Results   Component Value Date    TSH 1.620 11/22/2020    INR 1.2 11/21/2020    LABA1C 5.1 11/22/2020       Radiology: REVIEWED DAILY    ASSESSMENT/PLAN:  Pneumonia  Requiring 4L O2, baseline 2-3L  CXR with infiltrates, moist cough. Doxy and omnicef PO  Urine strep & Leg negative. Respiratory culture not collected, moist but non-productive cough. C&DB, IS, OOB for meals.   RUE edema  US negative for DVT.    Grade 2 open left ankle fracture, bimalleolar status post open reduction and internal fixation.    Ortho signed off, follow-up with Dr. Krystal Nelson in 2 weeks  PT/OT, NWB LLE  Bowel regimen  Hemoglobin stable  No pain  Hyperkalemia, resolved  Frequent falls  Generalized weakness  PT/OT  History of atrial fibrillation on chronic anticoagulation  Home Eliquis resumed  Stage IV CKD, stable  HTN, controlled  COPD  Duonebs Q4H while awake  DB&C, IS, OOB for meals  End expiratory wheezes, no improvement in O2 requirements with PNA tx or prednisone. Consult pulmonology. Chronic hypoxic respiratory failure  2-3 L O2 dependent at baseline  DM type II, controlled  A1C 5.1  MDSS  Anemia, macrocytic  Hb stable at 9.3  B12 WNL  Folate 3.1, daily supplementation. Diastolic dysfunction, grade 2  RBBB  Tobacco dependence  Hypothyroidism  Resume home synthroid  TSH 1.62     DISPOSITION: [facility]- d/c planning in progress    +++++++++++++++++++++++++++++++++++++++++++++++++  KIM Hinkle Physician - [address] [place] [address]  +++++++++++++++++++++++++++++++++++++++++++++++++  NOTE: This report was transcribed using voice recognition software. Every effort was made to ensure accuracy; however, inadvertent computerized transcription errors may be present.

## 2020-11-29 NOTE — CONSULTS
[facility]  Department of Internal Medicine  Division of Pulmonary, Critical Care and Sleep Medicine  Consult Note    Radha Pruett DO, [facility], [facility], [address]  Nandini Augustin MD, [facility]    Patient: Ines Rose  MRN: [id number]  : 1940    Encounter Time: 12:51 PM     Date of Admission: 2020 11:13 PM    Primary Care Physician: Vivian Merritt MD    Reason for Consultation: COPD, On Chronic Oxygen, Smoker     HISTORY OF PRESENT ILLNESS : Ines Rose [de-identified] y.o. female was seen in consultation regarding the above chief compliant. [de-identified] y.o. female who presented to [facility] with past medical history of stage IV chronic kidney disease, COPD, oxygen dependent on 2 L at home, tobacco dependence, hypertension, gout, hyperlipidemia, DVT in both legs 5 years ago, atrial fibrillation on anticoagulation with Eliquis, hypothyroidism, chronic diastolic CHF, diabetes, severe pulmonary hypertension, ambulatory dysfunction and frequent falls. Patient has been feeling weak for some days. She reports having had a fall earlier in the day yesterday that required EMS coming into the house to pick her up. Patient went into the bathroom and got stuck on the toilet. Her  who is also elderly was trying to help her get up when she fell and had her ankle twisted. She reports bumping her head and her bottom. No loss of consciousness, she complains of severe pain involving the left ankle, pain is throbbing, constant, associated with open wound in the leg, got some relief with IV pain medication in the ER. This did not last long. Leg is currently immobilized. She denies any chest pain or shortness of breath, no cough or fever. No abdominal pain. Appetite has been reduced. No urinary or bowel complain    Over the course of her hospitalization she has required oxygen despite steroid and antibiotics. She uses at least 4 liters at home, no 100% compliant and still smokes.  She uses Trelegy and Albuterol at home daily. PAST MEDICAL HISTORY:  has a past medical history of COPD (chronic obstructive pulmonary disease) ([facility] [address] SURGICAL HISTORY:  has a past surgical history that includes Ankle fracture surgery (Left, 11/22/2020). SOCIAL HISTORY:  reports that she has been smoking. She has never used smokeless tobacco. She reports current alcohol use. She reports that she does not use drugs. FAMILY  HISTORY: family history is not on file. MEDICATIONS:    Prior to Admission medications    Medication Sig Start Date End Date Taking? Authorizing Provider   amLODIPine (NORVASC) 5 MG tablet Take 5 mg by mouth daily   Yes Historical Provider, MD   apixaban (ELIQUIS) 2.5 MG TABS tablet Take 2.5 mg by mouth 2 times daily   Yes Historical Provider, MD   furosemide (LASIX) 40 MG tablet Take 60 mg by mouth 2 times daily   Yes Historical Provider, MD   levothyroxine (SYNTHROID) 88 MCG tablet Take 88 mcg by mouth Daily   Yes Historical Provider, MD   lisinopril (PRINIVIL;ZESTRIL) 10 MG tablet Take 12.5 mg by mouth daily   Yes Historical Provider, MD   metoprolol tartrate (LOPRESSOR) 25 MG tablet Take 6.25 mg by mouth 2 times daily   Yes Historical Provider, MD   potassium chloride (KLOR-CON) 10 MEQ extended release tablet Take 10 mEq by mouth daily   Yes Historical Provider, MD   lovastatin (MEVACOR) 10 MG tablet Take 10 mg by mouth nightly   Yes Historical Provider, MD   allopurinol (ZYLOPRIM) 300 MG tablet Take 300 mg by mouth daily   Yes Historical Provider, MD   oxyCODONE-acetaminophen (PERCOCET) 5-325 MG per tablet Take 1 tablet by mouth every 6 hours as needed for Pain for up to 7 days. Intended supply: 7 days. Take lowest dose possible to manage pain 11/22/20 11/29/20 Yes Ramos Al DO       ALLERGIES: Amoxicillin and Ciprofloxacin       REVIEW OF SYSTEMS:  Otherwise negative if not reported or listed below  Constitutional:  No unanticipated weight loss.       No change in sleep pattern or activity. No fevers, chills or rigors. Eyes:    No visual changes or diplopia. No scleral icterus. ENT:    No Headaches, hearing loss or vertigo. No mouth sores or sore throat. Cardiovascular:  + chest discomfort, palpitations. Respiratory:   + cough, No wheezing      No sputum production. No hemoptysis, pleuritic pain. Gastrointestinal:  No abdominal pain, appetite loss, blood in stools. No hematemesis  Genitourinary:  No dysuria, trouble voiding or hematuria. No nocturia. Musculoskeletal:   No weakness or joint complaints. Integumentary: No rashes or pruritis. Neurological:  No headache, numbness or tingling. Psychiatric:   No effect on mood, memory, mentation, or  behavior. No anxiety or depression. Endocrine:   No excessive thirst, fluid intake, or urination. No tremor. Hematologic:  No abnormal bruising or bleeding. Lymphatic:  No swollen lymph nodes. Immunologic:  No hives or hx of anaphaxsis. OBJECTIVE:     PHYSICAL EXAM:   VITALS:     Intake/Output Summary (Last 24 hours) at 11/29/2020 1251  Last data filed at 11/29/2020 1108  Gross per 24 hour   Intake 710 ml   Output 750 ml   Net -40 ml        CONSTITUTIONAL:    Alert, NAD  SKIN:     Severe diffuse arm > legs skin discoloration  HEENT:     EOMI, MMM, No thrush  NECK:    No bruits, No JVP apprechiated  CV:      Afib,  + murmur, No rubs, No gallops  PULMONARY:   Couse BS,  No Wheezing, No Rales, No Rhonchi      Rigth sided noted egophony, Kyphosis  ABDOMEN:     Soft, non-tender. BS normal. No R/R/G  EXT:    + deformities . No clubbing.       + upper and lower lower extremity edema, mild venous     stasis  PULSE:   Diminished palpable.   PSYCHIATRIC:  Seems appropriate, No acute psycosis  MS:    + fractures, Mild weakness  NEUROLOGIC:   The clinical assessment is non-focal     DATA: IMAGING & TESTING:     LABORATORY TESTS:    CBC:   Lab Results   Component Value Date    WBC 5.7 2020    RBC 2.94 2020    HGB 9.3 2020    HCT 30.0 2020    .0 2020    MCH 31.6 2020    MCHC 31.0 2020    RDW 16.4 2020     2020    MPV 9.4 2020     CMP:    Lab Results   Component Value Date     2020    K 4.8 2020    K 5.0 2020     2020    CO2 26 2020    BUN 49 2020    CREATININE 2.0 2020    GFRAA 29 2020    LABGLOM 24 2020    GLUCOSE 144 2020    PROT 5.7 2020    LABALBU 3.1 2020    CALCIUM 8.8 2020    BILITOT 0.6 2020    ALKPHOS 101 2020    AST 11 2020    ALT 6 2020     PT/INR:    Lab Results   Component Value Date    PROTIME 12.9 2020    INR 1.2 2020     ABG:  No results found for: PH, PCO2, PO2, HCO3, BE, THGB, TCO2, O2SAT     PRO-BNP: No results found for: PROBNP   ABGs: No results found for: PH, PO2, PCO2  Hemoglobin A1C: No components found for: HGBA1C    IMAGING:  Imaging tests were completed and reviewed and discussed radiology and care team involved and reveals   Xr Ankle Left (min 3 Views)  After ORIF and casting for fractures of the distal left tibia and fibula. Xr Chest Portable    Result Date: 2020  EXAMINATION: ONE XRAY VIEW OF THE CHEST 2020 11:42 pm COMPARISON: None. HISTORY: ORDERING SYSTEM PROVIDED HISTORY: fall TECHNOLOGIST PROVIDED HISTORY: Reason for exam:->fall What reading provider will be dictating this exam?->CRC FINDINGS: The lungs are without acute focal process. There is no effusion or pneumothorax. Cardiomegaly. The osseous structures are without acute process. No evidence of acute trauma. SEVERE Cardiomegaly.     Us Dup Upper Extremity Right Venous    Result Date: 2020  Patient MRN:  [id number] : 1940 Age: [de-identified] years Gender: Female Order Date:  2020 4:51 PM EXAM: US DUP UPPER EXTREMITY RIGHT VENOUS NUMBER OF IMAGES:  31 INDICATION:  edema, r/o DVT edema, r/o DVT What reading provider will be dictating this exam?->[facility] COMPARISON: None Within the visualized vessels, there is no evidence for deep venous thrombosis There is good compressibility, there is good augmentation, there is good color flow. Within the visualized vessels there is no evidence for deep venous thrombosis      Assessment:   1. Acute hypoxemic respiratory failure  2. Fracture after call  3. Anemia  4. Hx of COPD reported  5. CKD        Plan:   1. On admission the CXR was under read but with severe cardiomegaly, on the 26 film clearly atelectasis +/_ pneumonia  2. US was negative for DVT  3. On bronchodilators  4. No LVEF assessment noted or in review  5. I/O seem adequate,   6.  All treatment was covered and she will improved with time and increase PT          Arsalan Galdamez, MPH, Elly Benitez  Professor of Internal Medicine  Pulmonary, Critical Care and Sleep Medicine

## 2020-11-30 LAB
METER GLUCOSE: 138 MG/DL (ref 74–99)
METER GLUCOSE: 140 MG/DL (ref 74–99)
METER GLUCOSE: 145 MG/DL (ref 74–99)
METER GLUCOSE: 170 MG/DL (ref 74–99)

## 2020-11-30 PROCEDURE — 97530 THERAPEUTIC ACTIVITIES: CPT

## 2020-11-30 PROCEDURE — 6370000000 HC RX 637 (ALT 250 FOR IP): Performed by: STUDENT IN AN ORGANIZED HEALTH CARE EDUCATION/TRAINING PROGRAM

## 2020-11-30 PROCEDURE — 99232 SBSQ HOSP IP/OBS MODERATE 35: CPT | Performed by: INTERNAL MEDICINE

## 2020-11-30 PROCEDURE — 1200000000 HC SEMI PRIVATE

## 2020-11-30 PROCEDURE — 2700000000 HC OXYGEN THERAPY PER DAY

## 2020-11-30 PROCEDURE — 2580000003 HC RX 258: Performed by: STUDENT IN AN ORGANIZED HEALTH CARE EDUCATION/TRAINING PROGRAM

## 2020-11-30 PROCEDURE — 82962 GLUCOSE BLOOD TEST: CPT

## 2020-11-30 PROCEDURE — 6370000000 HC RX 637 (ALT 250 FOR IP): Performed by: NURSE PRACTITIONER

## 2020-11-30 PROCEDURE — 94640 AIRWAY INHALATION TREATMENT: CPT

## 2020-11-30 RX ORDER — DOXYCYCLINE HYCLATE 100 MG/1
100 CAPSULE ORAL EVERY 12 HOURS SCHEDULED
Qty: 7 CAPSULE | Refills: 0 | Status: SHIPPED | OUTPATIENT
Start: 2020-11-30 | End: 2020-12-04

## 2020-11-30 RX ORDER — FOLIC ACID 1 MG/1
1 TABLET ORAL DAILY
Qty: 30 TABLET | Refills: 0 | Status: SHIPPED | OUTPATIENT
Start: 2020-11-30

## 2020-11-30 RX ORDER — CEFDINIR 300 MG/1
300 CAPSULE ORAL EVERY 12 HOURS SCHEDULED
Qty: 7 CAPSULE | Refills: 0 | Status: SHIPPED | OUTPATIENT
Start: 2020-11-30 | End: 2020-12-04

## 2020-11-30 RX ADMIN — AMLODIPINE BESYLATE 5 MG: 5 TABLET ORAL at 08:39

## 2020-11-30 RX ADMIN — CEFDINIR 300 MG: 300 CAPSULE ORAL at 20:01

## 2020-11-30 RX ADMIN — LEVOTHYROXINE SODIUM 88 MCG: 0.09 TABLET ORAL at 06:54

## 2020-11-30 RX ADMIN — DOXYCYCLINE HYCLATE 100 MG: 100 CAPSULE ORAL at 20:01

## 2020-11-30 RX ADMIN — IPRATROPIUM BROMIDE AND ALBUTEROL SULFATE 1 AMPULE: .5; 3 SOLUTION RESPIRATORY (INHALATION) at 19:09

## 2020-11-30 RX ADMIN — INSULIN LISPRO 1 UNITS: 100 INJECTION, SOLUTION INTRAVENOUS; SUBCUTANEOUS at 20:08

## 2020-11-30 RX ADMIN — IPRATROPIUM BROMIDE AND ALBUTEROL SULFATE 1 AMPULE: .5; 3 SOLUTION RESPIRATORY (INHALATION) at 09:52

## 2020-11-30 RX ADMIN — INSULIN LISPRO 1 UNITS: 100 INJECTION, SOLUTION INTRAVENOUS; SUBCUTANEOUS at 08:42

## 2020-11-30 RX ADMIN — METOPROLOL TARTRATE 6.25 MG: 25 TABLET, FILM COATED ORAL at 20:05

## 2020-11-30 RX ADMIN — CEFDINIR 300 MG: 300 CAPSULE ORAL at 08:39

## 2020-11-30 RX ADMIN — APIXABAN 2.5 MG: 2.5 TABLET, FILM COATED ORAL at 08:39

## 2020-11-30 RX ADMIN — DOXYCYCLINE HYCLATE 100 MG: 100 CAPSULE ORAL at 08:39

## 2020-11-30 RX ADMIN — APIXABAN 2.5 MG: 2.5 TABLET, FILM COATED ORAL at 20:01

## 2020-11-30 RX ADMIN — Medication 10 ML: at 08:40

## 2020-11-30 RX ADMIN — METOPROLOL TARTRATE 6.25 MG: 25 TABLET, FILM COATED ORAL at 08:38

## 2020-11-30 RX ADMIN — IPRATROPIUM BROMIDE AND ALBUTEROL SULFATE 1 AMPULE: .5; 3 SOLUTION RESPIRATORY (INHALATION) at 13:19

## 2020-11-30 RX ADMIN — FOLIC ACID 1 MG: 1 TABLET ORAL at 08:39

## 2020-11-30 ASSESSMENT — PAIN SCALES - GENERAL
PAINLEVEL_OUTOF10: 0

## 2020-11-30 NOTE — PROGRESS NOTES
Date: 2020       Patient Name: Tawni Kawasaki  : 1940      MRN: [id number]    Pt declining PT services at this time due to fatigue. Pt educated on benefits of mobilization.   Will follow up as appropriate    Sultana Velásquez PT, DPT  [id number]

## 2020-11-30 NOTE — CARE COORDINATION
11/30/2020 social work transition of care planning  Sw followed up with Caesar Queen at the [facility], they are not accepting patients at this time. Jonna faxed clinicals to [facility] acute [facility] [phone] (ph [phone]),[facility] await response. Jonna will follow.   Electronically signed by DANIELLE Porter on 11/30/2020 at 12:29 PM

## 2020-11-30 NOTE — PROGRESS NOTES
[facility]  Department of Internal Medicine  Division of Pulmonary, Critical Care and Sleep Medicine  Consult Note    Brady Cazares DO, [facility], [facility], [facility] [address] Lalo Lee MD, [facility]    Patient: Dona Mooney  MRN: [id number]  : 1940    Encounter Time: 2:57 PM     Date of Admission: 2020 11:13 PM    Primary Care Physician: Tana Chinchilla MD    Reason for Consultation: COPD, On Chronic Oxygen, Smoker     SUBJECTIVE:    Chronic disease management is appropriate  No changes    OBJECTIVE:     PHYSICAL EXAM:   VITALS:     Intake/Output Summary (Last 24 hours) at 2020 1457  Last data filed at 2020 0937  Gross per 24 hour   Intake 700 ml   Output 650 ml   Net 50 ml        CONSTITUTIONAL:    Alert, NAD  SKIN:     Severe diffuse arm > legs skin discoloration  HEENT:     EOMI, MMM, No thrush  NECK:    No bruits, No JVP apprechiated  CV:      Afib,  + murmur, No rubs, No gallops  PULMONARY:   Couse BS,  No Wheezing, No Rales, No Rhonchi      Rigth sided noted egophony, Kyphosis  ABDOMEN:     Soft, non-tender. BS normal. No R/R/G  EXT:    + deformities . No clubbing.       + upper and lower lower extremity edema, mild venous     stasis  PULSE:   Diminished palpable.   PSYCHIATRIC:  Seems appropriate, No acute psycosis  MS:    + fractures, Mild weakness  NEUROLOGIC:   The clinical assessment is non-focal     DATA: IMAGING & TESTING:     LABORATORY TESTS:    CBC:   Lab Results   Component Value Date    WBC 5.7 2020    RBC 2.94 2020    HGB 9.3 2020    HCT 30.0 2020    .0 2020    MCH 31.6 2020    MCHC 31.0 2020    RDW 16.4 2020     2020    MPV 9.4 2020     CMP:    Lab Results   Component Value Date     2020    K 4.8 2020    K 5.0 2020     2020    CO2 26 2020    BUN 49 2020    CREATININE 2.0 2020    GFRAA 29 2020    LABGLOM 24 2020    GLUCOSE 144 2020    PROT 5.7 2020    LABALBU 3.1 2020    CALCIUM 8.8 2020    BILITOT 0.6 2020    ALKPHOS 101 2020    AST 11 2020    ALT 6 2020     PT/INR:    Lab Results   Component Value Date    PROTIME 12.9 2020    INR 1.2 2020     ABG:  No results found for: PH, PCO2, PO2, HCO3, BE, THGB, TCO2, O2SAT     PRO-BNP: No results found for: PROBNP   ABGs: No results found for: PH, PO2, PCO2  Hemoglobin A1C: No components found for: HGBA1C    IMAGING:  Imaging tests were completed and reviewed and discussed radiology and care team involved and reveals   Xr Ankle Left (min 3 Views)  After ORIF and casting for fractures of the distal left tibia and fibula. Xr Chest Portable    Result Date: 2020  EXAMINATION: ONE XRAY VIEW OF THE CHEST 2020 11:42 pm COMPARISON: None. HISTORY: ORDERING SYSTEM PROVIDED HISTORY: fall TECHNOLOGIST PROVIDED HISTORY: Reason for exam:->fall What reading provider will be dictating this exam?->CRC FINDINGS: The lungs are without acute focal process. There is no effusion or pneumothorax. Cardiomegaly. The osseous structures are without acute process. No evidence of acute trauma. SEVERE Cardiomegaly. Us Dup Upper Extremity Right Venous    Result Date: 2020  Patient MRN:  [id number] : 1940 Age: [de-identified] years Gender: Female Order Date:  2020 4:51 PM EXAM: US DUP UPPER EXTREMITY RIGHT VENOUS NUMBER OF IMAGES:  31 INDICATION:  edema, r/o DVT edema, r/o DVT What reading provider will be dictating this exam?->[facility] COMPARISON: None Within the visualized vessels, there is no evidence for deep venous thrombosis There is good compressibility, there is good augmentation, there is good color flow. Within the visualized vessels there is no evidence for deep venous thrombosis      Assessment:   1. Acute hypoxemic respiratory failure  2. Fracture after call  3. Anemia  4.  Hx of COPD reported  5. CKD        Plan:   1. On admission the CXR was under read but with severe cardiomegaly, on the 26 film clearly atelectasis +/_ pneumonia  2. US was negative for DVT  3. On bronchodilators  4. No LVEF assessment noted or in review  5. I/O seem adequate,   6.  All treatment was covered and she will improved with time and increase PT          Enolia Simmonds, MPH, Promise Nguyen  Professor of Internal Medicine  Pulmonary, Critical Care and Sleep Medicine

## 2020-11-30 NOTE — PROGRESS NOTES
OT BEDSIDE TREATMENT NOTE      Date:2020  Patient Name: Dona Mooney  MRN: [id number]  : 1940  Room: [address] [place] [address]     Evaluating OT: MAMIE Mesa, OTR/L   License #[id number]     Referring physician: Isauro Ace DO      AM-PAC Daily Activity Raw Score:      Recommended Adaptive Equipment: BSC, ww, AE for LB dressing and bathing       Diagnosis: Type 1 or Type II fracture of L ankle      Surgery: L Ankle I & D with ORIF Fixation 20     Precautions:  Falls, Safety, NWB L LE, 3 L O2, Purewick       Home Living: Pt lives with her  in a 3 story home with 3 PHILOMENA and 1 HR. First floor set-up.       Bathroom setup: Walk in shower with grab bars, [employer]   Equipment owned: ww, BSC, shower chair      Prior Level of Function:   Independent with ADLs ,  Independent with IADLs; using ww for mobility.      Driving: Yes                           Medication Management Self  Occupation: Pt. Did not state     Pain Level: Pt. Did not complain of pain pre or post session.      Cognition: A&O: 4/4; Follows multi step directions              Memory:  Good -               Sequencing:  Fair+               Problem solving:  Fair +              Judgement/safety:  Fair                      Functional Assessment:     Initial Eval Status  Date: 20 Treatment Status  Date: 20 STGs = LTGs  Time frame: 3-5 days    Feeding Set-Up  Set-up    Mod I    Grooming Set-Up  Setup    To wash hands and face seated at EOB  Mod I when seated    UB Dressing Min A   Min A      Set-Up    LB Dressing Dependent  Pt. Was unable to reach her B/L LEs when long sitting in bed or seated at EOB to [facility] socks  Dep;     To complete donning of R sock. Pt. unable to reach LEs           Mod A    Bathing Mod A   Mod A     Min A seated with AE    Toileting Mod A    using bedpan   Pt. Was able to complete front hygiene  Dep.      Incontinent Min A using BSC    Bed Mobility  Supine to sit:Mod A     Sit to supine: Mod A      Log Rolling: Min A  Supine to sit: Mod A   Sit to supine: Mod A     Log Rolling: NT  Supine to sit:   Sit to supine:    Supine to sit: Min A   Sit to supine: Min A        Functional Transfers Sit > Stand: NT   Stand > Sit: NT   SPT: NT   Pt. Deferred this session despite encouragement from the therapist  Sit <> Stand:  Max A X 2  with ww       Mod A with ww for all functional transfers. Functional Mobility NT  Max A X 2 with ww to attempt to stand pivot to bedside chair. Pt. Unable to pivot on R LE and maintain NWB status for L LE         Min A with ww   for all functional distances. Balance Sitting:        Static:  SBA    Dynamic:SBA     Standing: NT  Sitting:     Static: Indep    Dynamic:Sup     Standing: Max A X 2  Sitting:        Static:  Independent          Dynamic: Independent          Standing: Mod A with ww   Activity Tolerance Poor+ with light activity. Pt. Was limited by weakness in her LEs and was fearful of getting out of bed.      91 % Sp O2 on 3 L O2  110 bpm       Fair with light activity   Pre activity   SPO2 97 on 3 L O2   HR 82 bpm      Post activity   SPO2 91 on 3 L O2  HR 82 bpm  Fair + with light activity.    Visual/  Perceptual Glasses: Yes      Vision: WFL          Safety Fair with min verbal cues to maintain NWB status for L LE when transferring from EOB to supine   Fair Good  for all ADLs while maintaining NWB for L LE         Comments: Upon arrival pt was supine in bed with HOB elevated and on 3 L O2. At end of session patient was supine in bed with HOB elevated, all lines and tubes intact, call light within reach. Respiratory therapy was present in the room at the end of the therapy session. · Pt has made fair progress towards set goals. · Continue with current plan of care    Treatment:    Therapist facilitated self-care: grooming, LB dressing and toileting tasks.  Patient was educated on NWB status for L LE.    Patient was educated on deep breathing exercises.  Patient was educated on safety with ww.  Patient was educated on proper body positioning when using ww.  Patient demonstrated good understanding of deep breathing techniques.  Patient demonstrated good understanding of NWB status for the L LE.    Patient demonstrated good understanding of sequencing and safety with ww.      Treatment Time In:9:30            Treatment Time Out: 9:53             Treatment Charges: Mins Units   Ther Ex  [id number]     Manual Therapy [id number]     Thera Activities [id number]   ADL/Home Mgt [id number]     Neuro Re-ed [id number]     Group Therapy      Orthotic manage/training  [id number]     Non-Billable Time     Total Timed Treatment 23 [address], OTR/L License #  [id number]

## 2020-11-30 NOTE — ADT AUTH CERT
Patient Demographics     Name  Patient ID  SSN  Gender Identity  Birth Date    Latesha Kong  [id number]    Female  40 ([de-identified] yrs)    Address  Phone  Email  Employer     [employer] [address] [phone] (M)   [phone] (M)  --  [employer]  Race  Occupation  Emp Status     --  White  --  Retired     Reg Status  PCP  Date Last Verified  Next Review Date     Verified  [address], [place]  [phone]  20     Admission Date  Discharge Date  Admitting Provider      20  --  Mitchell Pritchard MD      Marital Status  Hinduism       Unknown  --       Emergency Contact 1    Promise Mustafa (2) [phone] Gavinkeiko Steve Mora  [de-identified]   [address] Name/Sex/Relation  Subscriber   Subscriber Address/Phone  Subscriber Emp/Emp Phone    1Stefani Alarcon [address]   [id number]  [facility] - Female   (Self)  1940  [place] [address]   [phone](E)  RETIRED    Utilization Reviews         Ankle Fracture, Closed, Open Reduction, Internal Fixation (ORIF) - Care Day 5 (2020) by Terrie Agosto RN         Review Entered  Review Status    2020 07:28  Completed        Criteria Review       Care Day: 5 Care Date: 2020 Level of Care: Inpatient Floor    Guideline Day 2    Clinical Status    (X) * Procedure completed    2020 7:28 AM EST by Devaughn Garland      on 20    ( ) * Adequate supported ambulation    (X) * Pain absent or managed    2020 7:28 AM EST by Devaughn Garland      no pain meds given    ( ) * Other injuries requiring inpatient care absent    (X) * No evidence of postoperative or surgical site infection    2020 7:28 AM EST by Devaughn Garland      no post op infection    ( ) * Discharge plans and education understood    Activity    ( ) * Ambulatory with crutches or walker [C]    Routes    (X) * Oral hydration, medications, and diet    2020 7:28 AM EST by Devaughn Garland   po hydration, meds, diet    Interventions    (X) Possible DVT prophylaxis    11/30/2020 7:28 AM EST by Vani decker po bid    Medications    (X) * PCA absent [D]    11/30/2020 7:28 AM EST by Vani Wakefield      no pca    * Milestone    Additional Notes    11/26/20             97.4   16   85   110/56   87% on 3L o2 and 96% on 4L o2             Cxr= Patchy bibasilar infiltrates. Cardiomegaly. Sultana Cheeks are no findings of failure. Sodium 132 - 146 mmol/L 136    Potassium 3.5 - 5.0 mmol/L 5.2High      Chloride 98 - 107 mmol/L 104    CO2 22 - 29 mmol/L 22    Anion Gap 7 - 16 mmol/L 10    Glucose 74 - 99 mg/dL 166High      BUN 8 - 23 mg/dL 43High      CREATININE 0.5 - 1.0 mg/dL 2.0    Ca 8.9          WBC 4.5 - 11.5 E9/L 5.7    RBC 3.50 - 5.50 E12/L 3.10Low      Hemoglobin 11.5 - 15.5 g/dL 10.0Low      Hematocrit 34.0 - 48.0 % 31.5Low               Meds:    Scheduled Meds:insulin lispro, 0-12 Units, Subcutaneous, TID WC    insulin lispro, 0-6 Units, Subcutaneous, Nightly    folic acid, 1 mg, Oral, Daily    sodium chloride flush, 10 mL, Intravenous, 2 times per day    metoprolol tartrate, 6.25 mg, Oral, BID    apixaban, 2.5 mg, Oral, BID    amLODIPine, 5 mg, Oral, Daily    ipratropium-albuterol, 1 ampule, Inhalation, Q4H WA    levothyroxine, 88 mcg, Oral, Daily             Md notes:    11/26 int med:    107 [facility] waiting for acceptance to [facility].  having issues finding facilities accepting admissions. Grade 2 open left ankle fracture, bimalleolar status post open reduction and internal fixation.  POD#2    Ortho signed off, follow-up with Dr. Alfredo Delgadillo in 2 weeks    PT/OT, NWB LLE    Bowel regimen    Hemoglobin stable    Not requiring PRN pain meds    Frequent falls    Generalized weakness    History of atrial fibrillation on chronic anticoagulation    Home Eliquis resumed    Stage IV CKD    Cr stable at 2.0.     HTN, controlled    COPD    Duonebs Q4H while awake    Expiratory wheezing, improved DB&C, IS, OOB for meals    Prednisone 40mg PO x 4 more days    CXR with patchy bibasilar infiltrates.     Chronic hypoxic respiratory failure    2-3 L O2 dependent at baseline    DM type II, controlled    A1C 5.1    Borderline hypoglycemic yesterday, will scale down to LDSS    Anemia, macrocytic    Hb stable at 10.0    Add on B12 & folate    Diastolic dysfunction, grade 2    RBBB    Tobacco dependence    Hypothyroidism    Resume home synthroid    TSH 1.62          Orders:    11/26 cxr, labs in am, duoneb hhn q 4 hrs, cont ssi, above meds, cont pt/ot        Ankle Fracture, Closed, Open Reduction, Internal Fixation (ORIF) - Care Day 3 (11/24/2020) by Bernadette Cordero RN         Review Entered  Review Status    11/30/2020 07:17  Completed        Criteria Review       Care Day: 3 Care Date: 11/24/2020 Level of Care: Inpatient Floor    Guideline Day 2    Clinical Status    (X) * Procedure completed    11/30/2020 7:17 AM EST by Jess Invajo      on 11/22/20    ( ) * Adequate supported ambulation    (X) * Pain absent or managed    11/30/2020 7:17 AM EST by Nethubmerry Invajo      no pain meds given    ( ) * Other injuries requiring inpatient care absent    (X) * No evidence of postoperative or surgical site infection    11/30/2020 7:17 AM EST by Aldaphne Invajo      no post op infection    ( ) * Discharge plans and education understood    Activity    ( ) * Ambulatory with crutches or walker [C]    Routes    (X) * Oral hydration, medications, and diet    11/30/2020 7:17 AM EST by Aldaphne Invajo      po hydration, meds, diet    Interventions    (X) Possible DVT prophylaxis    11/30/2020 7:17 AM EST by Localize Direct      eliquis po bid    Medications    (X) * PCA absent [D]    11/30/2020 7:17 AM EST by Nethubmerry Invajo      no pca    * Milestone    Additional Notes    11/24/20          97.9   16   83   117/57   94% on 3L o2             Meds:    Scheduled Meds:insulin lispro, 0-12 Units, Subcutaneous, TID WC    insulin lispro, 0-6 Units, Subcutaneous, Nightly    folic acid, 1 mg, Oral, Daily    sodium chloride flush, 10 mL, Intravenous, 2 times per day    metoprolol tartrate, 6.25 mg, Oral, BID    apixaban, 2.5 mg, Oral, BID    amLODIPine, 5 mg, Oral, Daily    ipratropium-albuterol, 1 ampule, Inhalation, Q4H WA    levothyroxine, 88 mcg, Oral, Daily             Md notes:    11/24 ortho:    · S/p L ankle ORIF 11/22         PLAN      · NWB LLE    · Pain control IV & PO    · DVT prophylaxis- lovenox, early mobilization    · Monitor Labs    · Bowel regiment    · Pulmonary hygiene    · Trend H&H    · 24 hr post op ABX completed    · PT/OT    · D/C planning, SW/PT recs    · No further acute orthopedic intervention at this time          11/24 int med:    Alexey Hightower well controlled.  Patient expressed fear of falling when getting up with therapy.  Encouraged patient to work with therapy to prevent deconditioning. Grade 2 open left ankle fracture, bimalleolar status post open reduction and internal fixation.  POD#2    Ortho signed off, follow-up with Dr. Nimo Tilley in 2 weeks    PT/OT, NWB LLE    Pain control    Bowel regimen    Hemoglobin stable    Not requiring PRN pain meds    Frequent falls    Generalized weakness    History of atrial fibrillation on chronic anticoagulation    Home Eliquis resumed    Stage IV CKD    Cr 2.5, at apparent baseline    HTN, controlled    Home meds resumed    COPD    Duonebs Q4H while awake    Diffuse wheezing    Chronic hypoxic respiratory failure    3 L O2 dependent at baseline    DM type II, controlled    A1C 5.1    Acceptable glycemic control with MDSS    Diastolic dysfunction, grade 2    RBBB    Tobacco dependence    Hypothyroidism    Resume home synthroid    TSH 1.62          Orders:    11/24 cont ssi, duoneb hhn q 4 hrs, cont above meds, cont pt/ot             DCP: 11/24 [facility] has not beds.  [facility] is testing for Covid and [facility] is not authorized to accept admissions yet. Marcella Jiang said to check back Wednesday or Friday.  3rd. [facility]. Left VM about referral with call back number. Miguel Santos, L.S.W.    [phone]        covid 19 neg by Rhett Renteria RN         Review Entered  Review Status    11/24/2020 09:16  In Primary        Criteria Review    The illness suspected to be related to the Coronavirus (COVID-19)? No   Has the member been tested for the COVID-19? Yes   If Yes, what are the results of the COVID-19?               Negative -   SARS-CoV-2, NAAT  Not Detected       What is the severity of the members condition (o2 at 3L)

## 2020-11-30 NOTE — CARE COORDINATION
11/30/2020social work transition of care planning  Sw left message for Caesar Pump at [facility] [phone], will await return call.    Electronically signed by DANIELLE Malone on 11/30/2020 at 8:04 AM

## 2020-11-30 NOTE — CARE COORDINATION
11/30/2020 Social work transition of care planning  Sw notified that [facility]. They will have a bed tomorrow. Pt has neg covid from Yesterday and will need Derek Wild (sw will complete). Building NPI [id number] Dr. Shanice Dasilva (NPI [id number]) [phone] [facility] for life medicare. Sw will follow.   Electronically signed by DANIELLE Grover on 11/30/2020 at 2:18 PM]

## 2020-11-30 NOTE — PROGRESS NOTES
Hospitalist Progress Note    SYNOPSIS: Patient admitted on 11/21/2020 for a left ankle injury beginning after a fall while her  was trying to assist her up from the toilet.  She admits to bumping her head and her bottom but reports no pain aside from her left ankle.  She denies any loss of consciousness.  She is on Eliquis for history of DVT.  In ER there was noted to be a large laceration to the medial aspect of the ankle. Orthopedics was consulted in the ER with plans to go to the OR on 11/22/2020 for open displaced fractures of distal shaft of the medial malleolus abd displaced fracture of distal fibula.   She also complains of weakness and decreased appetite times several days which is why her  is attempting to help her get off of the toilet. 11/22: Open reduction and internal fixation left bimalleolar ankle fracture and left ankle syndesmosis  11/23: POD#1.  No complaints.  Pain well controlled. 11/24: POD #2. No complaints. Pain well controlled.  Patient expressed fear of falling when getting up with therapy.  Encouraged patient to work with therapy to prevent deconditioning. 11/25: POD #3.  Ok for d/c per ortho with follow up. Increased wheezing.  Will start PO steroids.  Awaiting acceptance to [facility]. Stable for discharge.   11/26: POD#4. Patient doing well, no complaints. Still waiting for acceptance to [facility].  having issues finding facilities accepting admissions. 11/27: POD #5. Increasing O2 requirements. Cxr with bibasilar infiltrates.  Procal 0.15. Moist cough. Patient feeling well.  Upset that none of the SARs she wanted to go to are accepting patients.   11/28: Still requiring 4L NC, though SpO2 97%.  Could probably wean down to baseline. RUE swelling without erythema or warmth. Will check US.   11/29: No evidence of DVT on US. Requiring 4L O2, SpO2 94%. Medically stable for discharge. Awaiting [facility] acceptance. 11/30: Pulmonology consulted for recommendations re COPD.  Medically stable for discharge. SYNOPSIS: Patient admitted on 11/21/2020 for a left ankle injury beginning after a fall while her  was trying to assist her up from the toilet.  She admits to bumping her head and her bottom but reports no pain aside from her left ankle.  She denies any loss of consciousness.  She is on Eliquis for history of DVT.  In ER there was noted to be a large laceration to the medial aspect of the ankle. Orthopedics was consulted in the ER with plans to go to the OR on 11/22/2020 for open displaced fractures of distal shaft of the medial malleolus abd displaced fracture of distal fibula.   She also complains of weakness and decreased appetite times several days which is why her  is attempting to help her get off of the toilet. 11/22: Open reduction and internal fixation left bimalleolar ankle fracture and left ankle syndesmosis  11/23: POD#1.  No complaints.  Pain well controlled. 11/24: POD #2. No complaints. Pain well controlled.  Patient expressed fear of falling when getting up with therapy.  Encouraged patient to work with therapy to prevent deconditioning. 11/25: POD #3.  Ok for d/c per ortho with follow up. Increased wheezing.  Will start PO steroids.  Awaiting acceptance to [facility]. Stable for discharge.   11/26: POD#4. Patient doing well, no complaints. Still waiting for acceptance to [facility].  having issues finding facilities accepting admissions. 11/27: POD #5. Increasing O2 requirements. Cxr with bibasilar infiltrates.  Procal 0.15. Moist cough. Patient feeling well.  Upset that none of the SARs she wanted to go to are accepting patients.   11/28: Still requiring 4L NC, though SpO2 97%.  Could probably wean down to baseline. RUE swelling without erythema or warmth. Will check US.   11/29: No evidence of DVT on US. Requiring 4L O2, SpO2 94%. Medically stable for discharge. Awaiting [facility] acceptance. 11/30: Medically stable for discharge.      Hospital day 8 SUBJECTIVE:  Stable overnight. No issues reported. Patient seen and examined  Records reviewed. Temp (24hrs), Av.2 °F (36.8 °C), Min:97.5 °F (36.4 °C), Max:98.8 °F (37.1 °C)    DIET: DIET GENERAL; Carb Control: 4 carb choices (60 gms)/meal; Low Potassium  CODE: Full Code    Intake/Output Summary (Last 24 hours) at 2020 0742  Last data filed at 2020 2250  Gross per 24 hour   Intake 700 ml   Output 650 ml   Net 50 ml       OBJECTIVE:    BP (!) 96/51   Pulse 73   Temp 98.8 °F (37.1 °C) (Temporal)   Resp 18   Ht 5' 2\" (1.575 m)   Wt 160 lb (72.6 kg)   SpO2 90%   BMI 29.26 kg/m²     General appearance: Elderly female in no apparent distress, appears stated age and cooperative. HEENT: Normal cephalic, atraumatic without obvious deformity. Pupils equal, round, and reactive to light.  Extra ocular muscles intact. Conjunctivae/corneas clear. Neck: Supple, with full range of motion. No jugular venous distention. Trachea midline. Respiratory:  End expiratory wheezes throughout, diminished in the bilateral bases. Prolonged expiratory phase with tachypnea. No apparent distress. Cardiovascular:  Regular rate and rhythm. S1, S2 without murmurs, rubs, or gallops. PV: Brisk capillary refill.  +2 radial pulses bilaterally. 2+ right pedal pulse.  Unable to palpate left pedal pulse d/t dressing.  Left toes warm with brisk cap refill. No clubbing, cyanosis, edema of bilateral lower extremities.  Non pitting edema of RUE, improving. No erythema or warmth.   Abdomen: Soft, non-tender, non-distended. +BS  Musculoskeletal: No obvious deformities or erythematous or edematous joints. Limited ROM of the BUE R>L. Skin: Normal skin color. Dressing to LLE CDI. Ecchymosis and skin tears to the BUE. Neurologic:  Neurovascularly intact without any focal sensory/motor deficits.  Cranial nerves: II-XII intact, grossly non-focal.  Psychiatric: Alert and oriented, thought content appropriate, normal insight       Medications:  REVIEWED DAILY    Infusion Medications    dextrose       Scheduled Medications    insulin lispro  0-12 Units Subcutaneous TID     insulin lispro  0-6 Units Subcutaneous Nightly    doxycycline hyclate  100 mg Oral 2 times per day    cefdinir  300 mg Oral 2 times per day    folic acid  1 mg Oral Daily    sodium chloride flush  10 mL Intravenous 2 times per day    metoprolol tartrate  6.25 mg Oral BID    apixaban  2.5 mg Oral BID    amLODIPine  5 mg Oral Daily    ipratropium-albuterol  1 ampule Inhalation Q4H WA    levothyroxine  88 mcg Oral Daily     PRN Meds: polyethylene glycol, promethazine **OR** ondansetron, ipratropium-albuterol, glucose, dextrose, glucagon (rDNA), dextrose, senna, sodium chloride flush, morphine **OR** morphine, benzocaine-menthol, traMADol **OR** traMADol, acetaminophen **OR** acetaminophen    Labs:     Recent Labs     11/27/20  0928   WBC 5.7   HGB 9.3*   HCT 30.0*          Recent Labs     11/27/20  1205   K 4.8     Recent Labs     11/27/20  1205   TROPONINI <0.01       Chronic labs:    Lab Results   Component Value Date    TSH 1.620 11/22/2020    INR 1.2 11/21/2020    LABA1C 5.1 11/22/2020       Radiology: REVIEWED DAILY    ASSESSMENT/PLAN:  Pneumonia  Requiring baseline 2-3L O2  Doxy and omnicef PO  Urine strep & Leg negative.   Respiratory culture not collected, moist but non-productive cough.   C&DB, IS, OOB for meals.   RUE edema, improving  US negative for DVT. Grade 2 open left ankle fracture, bimalleolar status post open reduction and internal fixation.    Ortho signed off, follow-up with Dr. Tara Stern in 2 weeks  PT/OT, NWB LLE  Bowel regimen  Hemoglobin stable  No pain  Hyperkalemia, resolved  Frequent falls  Generalized weakness  PT/OT  History of atrial fibrillation on chronic anticoagulation  Home Eliquis resumed  Stage IV CKD, stable  HTN, controlled  COPD  Pulmonology consulted.   Duonebs Q4H while awake  DB&C, IS, OOB for

## 2020-12-01 VITALS
BODY MASS INDEX: 29.44 KG/M2 | OXYGEN SATURATION: 91 % | WEIGHT: 160 LBS | TEMPERATURE: 98.1 F | HEIGHT: 62 IN | DIASTOLIC BLOOD PRESSURE: 61 MMHG | RESPIRATION RATE: 17 BRPM | SYSTOLIC BLOOD PRESSURE: 102 MMHG | HEART RATE: 86 BPM

## 2020-12-01 LAB — METER GLUCOSE: 110 MG/DL (ref 74–99)

## 2020-12-01 PROCEDURE — 82962 GLUCOSE BLOOD TEST: CPT

## 2020-12-01 PROCEDURE — 2580000003 HC RX 258: Performed by: STUDENT IN AN ORGANIZED HEALTH CARE EDUCATION/TRAINING PROGRAM

## 2020-12-01 PROCEDURE — 6370000000 HC RX 637 (ALT 250 FOR IP): Performed by: STUDENT IN AN ORGANIZED HEALTH CARE EDUCATION/TRAINING PROGRAM

## 2020-12-01 PROCEDURE — 6370000000 HC RX 637 (ALT 250 FOR IP): Performed by: NURSE PRACTITIONER

## 2020-12-01 PROCEDURE — 97530 THERAPEUTIC ACTIVITIES: CPT

## 2020-12-01 PROCEDURE — 99232 SBSQ HOSP IP/OBS MODERATE 35: CPT | Performed by: INTERNAL MEDICINE

## 2020-12-01 PROCEDURE — 97535 SELF CARE MNGMENT TRAINING: CPT

## 2020-12-01 PROCEDURE — 2700000000 HC OXYGEN THERAPY PER DAY

## 2020-12-01 RX ADMIN — CEFDINIR 300 MG: 300 CAPSULE ORAL at 10:11

## 2020-12-01 RX ADMIN — DOXYCYCLINE HYCLATE 100 MG: 100 CAPSULE ORAL at 10:11

## 2020-12-01 RX ADMIN — APIXABAN 2.5 MG: 2.5 TABLET, FILM COATED ORAL at 10:10

## 2020-12-01 RX ADMIN — METOPROLOL TARTRATE 6.25 MG: 25 TABLET, FILM COATED ORAL at 10:09

## 2020-12-01 RX ADMIN — Medication 10 ML: at 10:08

## 2020-12-01 RX ADMIN — AMLODIPINE BESYLATE 5 MG: 5 TABLET ORAL at 10:12

## 2020-12-01 RX ADMIN — FOLIC ACID 1 MG: 1 TABLET ORAL at 10:10

## 2020-12-01 RX ADMIN — LEVOTHYROXINE SODIUM 88 MCG: 0.09 TABLET ORAL at 06:54

## 2020-12-01 NOTE — PROGRESS NOTES
Hospitalist Progress Note      PCP: Faith Hinojosa MD    Date of Admission: 11/21/2020    Chief Complaint: *LEFT ANKLE INJURY    Hospital Course: *  was helping her off the commode and she slipped and fell and sustain a fracture to her left ankle. She had OR on NOV 22. And is going to a [facility] today      Subjective: ** no complaints      Medications:  Reviewed    Infusion Medications    dextrose       Scheduled Medications    insulin lispro  0-12 Units Subcutaneous TID WC    insulin lispro  0-6 Units Subcutaneous Nightly    doxycycline hyclate  100 mg Oral 2 times per day    cefdinir  300 mg Oral 2 times per day    folic acid  1 mg Oral Daily    sodium chloride flush  10 mL Intravenous 2 times per day    metoprolol tartrate  6.25 mg Oral BID    apixaban  2.5 mg Oral BID    amLODIPine  5 mg Oral Daily    ipratropium-albuterol  1 ampule Inhalation Q4H WA    levothyroxine  88 mcg Oral Daily     PRN Meds: polyethylene glycol, promethazine **OR** ondansetron, ipratropium-albuterol, glucose, dextrose, glucagon (rDNA), dextrose, senna, sodium chloride flush, morphine **OR** morphine, benzocaine-menthol, traMADol **OR** traMADol, acetaminophen **OR** acetaminophen      Intake/Output Summary (Last 24 hours) at 12/1/2020 1457  Last data filed at 12/1/2020 0950  Gross per 24 hour   Intake 720 ml   Output 500 ml   Net 220 ml       Exam:    /61   Pulse 86   Temp 98.1 °F (36.7 °C)   Resp 17   Ht 5' 2\" (1.575 m)   Wt 160 lb (72.6 kg)   SpO2 91%   BMI 29.26 kg/m²           Gen: *well developed  HEENT: NC/AT, moist mucous membranes, no oropharyngeal erythema or exudate  Neck: supple, trachea midline, no anterior cervical or SC LAD  Heart:  Normal s1/s2, RRR, no murmurs, gallops, or rubs.    Lungs:  cta* bilaterally, **Abd: bowel sounds present, soft, nontender, nondistended, no masses  Extrem:  No clubbing, cyanosis,  LLE** edema  Skin: no rashes or lesions  Psych: A & O x3  Neuro: grossly intact, moves all four extremities. Capillary Refill: Brisk,< 3 seconds   Peripheral Pulses: +2 palpable, equal bilaterally               Labs:   No results for input(s): WBC, HGB, HCT, PLT in the last 72 hours. No results for input(s): NA, K, CL, CO2, BUN, CREATININE, CALCIUM, PHOS in the last 72 hours. Invalid input(s): MAGNES  No results for input(s): AST, ALT, BILIDIR, BILITOT, ALKPHOS in the last 72 hours. No results for input(s): INR in the last 72 hours. No results for input(s): Flor Lowers in the last 72 hours. No results for input(s): AST, ALT, ALB, BILIDIR, BILITOT, ALKPHOS in the last 72 hours. No results for input(s): LACTA in the last 72 hours.   No results found for: Kandy Roulette  No results found for: AMMONIA    Assessment:    Active Hospital Problems    Diagnosis Date Noted    Pneumonia [J18.9] 11/28/2020    Edema of right upper extremity [R60.0] 11/28/2020    Chronic respiratory failure with hypoxia ([facility] [J96.11] 11/24/2020    HTN (hypertension) [I10] 11/24/2020    Hypothyroidism [E03.9] 37/30/6297    Diastolic dysfunction [H52.63] 11/24/2020    Anticoagulated [Z79.01] 11/24/2020    Type I or II open fracture of left ankle [S82.892B] 11/22/2020    Weakness [R53.1] 11/22/2020    Ambulatory dysfunction [R26.2] 11/22/2020    Falls frequently [R29.6] 11/22/2020    Stage 4 chronic kidney disease ([facility] [N18.4] 11/22/2020    RBBB [I45.10] 11/22/2020    COPD (chronic obstructive pulmonary disease) ([facility] [J44.9] 11/22/2020    Tobacco dependence [F17.200] 11/22/2020    Atrial fibrillation ([facility] [I48.91] 11/22/2020    Type 2 diabetes mellitus ([facility] [E11.9] 11/22/2020   LLE ankle fracture     Plan:  **dc to [facility]    Electronically signed by Rylie Jacobo DO on 12/1/2020 at 2:57 PM [facility]

## 2020-12-01 NOTE — PROGRESS NOTES
Comprehensive Nutrition Assessment    Type and Reason for Visit:  Initial, RD Nutrition Re-Screen/LOS    Nutrition Recommendations/Plan: Recommend continuing current diet w/ addition of ONS(Ensure HP vanilla w/ Itz BID). -Please obtain phosphorus lab as pt has CKD IV & might benefit from Renal diet. Nutrition Assessment:  Pt w/ pmh dm, COVID-19, [facility]kd admin for fracture 2/2 fall. S/p ankle fracture surgery w/ debridement 11/22. Pt w/ no significant wt loss recorded eating at %. Malnutrition Assessment:  Malnutrition Status:  No malnutrition    Context:  Acute Illness     Findings of the 6 clinical characteristics of malnutrition:  Energy Intake:  No significant decrease in energy intake  Weight Loss:  No significant weight loss     Body Fat Loss:  No significant body fat loss     Muscle Mass Loss:  Unable to assess    Fluid Accumulation:  No significant fluid accumulation     Strength:  Not Performed    Estimated Daily Nutrient Needs:  Energy (kcal):  1378; kcal; Weight Used for Energy Requirements:  Current     Protein (g):  55-65 g; Weight Used for Protein Requirements:  Ideal(1.1-1.3 g/kg IBW( 50 kg) Note pt w/ CKD IV)          Nutrition Related Findings:  A/Ox4, dentures +missing teeth, noted constipation, +BS, +1 edema, +I/O's. Wounds:  Surgical Incision(Left ankle)       Current Nutrition Therapies:    DIET GENERAL; Carb Control: 4 carb choices (60 gms)/meal; Low Potassium    Anthropometric Measures:  · Height: 5' 2\" (157.5 cm)  · Current Body Weight: 160 lb (72.6 kg)(11/21)   · Admission Body Weight: (Same as CBW)    · Usual Body Weight: (No wt hx per emr)     · Ideal Body Weight: 110 lbs; % Ideal Body Weight 145.5 %   · BMI: 29.3   · BMI Categories: Overweight (BMI 25.0-29. 9)       Nutrition Diagnosis:   · Increased nutrient needs related to increase demand for energy/nutrients as evidenced by wounds    Nutrition Interventions:   Food and/or Nutrient Delivery:  Continue Current Diet, Start Oral Nutrition Supplement  Nutrition Education/Counseling:  No recommendation at this time   Coordination of Nutrition Care:  Continue to monitor while inpatient    Goals:  Pt to consume >75% of all meals/ONS       Nutrition Monitoring and Evaluation:   Food/Nutrient Intake Outcomes:  Food and Nutrient Intake  Physical Signs/Symptoms Outcomes:  Chewing or Swallowing, Biochemical Data, Nutrition Focused Physical Findings, Skin, Weight, GI Status, Fluid Status or Edema     Discharge Planning:     Too soon to determine     Electronically signed by KIMBERLY Bryant RD on 12/1/20 at 11:10 AM EST    Contact: [phone]

## 2020-12-01 NOTE — PROGRESS NOTES
Bed Mobility  Supine to sit:Mod A     Sit to supine: Mod A      Log Rolling: Min A  Sit to supine- Mod A   Educated pt on technique to increase independence. Max A scooting up in bed Supine to sit:   Sit to supine:    Supine to sit: Min A   Sit to supine: Min A        Functional Transfers Sit > Stand: NT   Stand > Sit: NT   SPT: NT   Pt. Deferred this session despite encouragement from the therapist  Sit <> Stand:  Max A x2  Cuing on body mechanics Mod A with ww for all functional transfers. Functional Mobility NT  N/A  Min A with ww   for all functional distances. Balance Sitting:        Static:  SBA    Dynamic:SBA     Standing: NT  Sitting:     Static: Indep    Dynamic: CGA     Standing: max A of 2 Sitting:        Static:  Independent         Dynamic: Independent     Standing: Mod A with ww   Activity Tolerance Poor+ with light activity. Pt. Was limited by weakness in her LEs and was fearful of getting out of bed.      91 % Sp O2 on 3 L O2  110 bpm        Fair  Fair + with light activity. Visual/  Perceptual Glasses: Yes      Vision: [facility]          Safety Fair with min verbal cues to maintain NWB status for L LE when transferring from EOB to supine   Fair Good  for all ADLs while maintaining NWB for L LE                                 Comments:  Upon arrival, patient seated EOB. Pt educated on techniques to increase independence and safety during ADL's, bed mobility, and functional transfers. At end of session, patient semi upright in bed with bedpan placed call light and phone within reach, all lines and tubes intact. RN notified. Educated pt on pushing call button when done.     Pt required cues and education as noted above for safe facilitation and completion of tasks.  Prior to and at the end of session, environmental modifications/line management completed for patients safety and efficiency of treatment session.      Time In: 9:15            Time Out:  10:00    Total Time: 25 minutes         Min Units   OT Eval Low 43338       OT Eval Medium 89290     OT Eval High 53355     OT Re-Eval 35010     Therapeutic Ex 29635     Therapeutic Activities 17736 10 1   ADL/Self Care 27908 15 1   Orthotic Management 62008       Neuro Re-Ed 53183       Non-Billable Time                 KEILY Jain/JOYCE [id number]

## 2020-12-01 NOTE — PROGRESS NOTES
[facility]  Department of Internal Medicine  Division of Pulmonary, Critical Care and Sleep Medicine  Consult Note    Georgia Portillo DO, [place], [place], [address] MD [place], [place]    Patient: Sheron Kirby  MRN: [id number]  : 1940    Encounter Time: 12:19 PM     Date of Admission: 2020 11:13 PM    Primary Care Physician: Izzy Zuleta MD    Reason for Consultation: COPD, On Chronic Oxygen, Smoker     SUBJECTIVE:  Feels good  Chronic disease management is appropriate      OBJECTIVE:     PHYSICAL EXAM:   VITALS:     Intake/Output Summary (Last 24 hours) at 2020 1219  Last data filed at 2020 0950  Gross per 24 hour   Intake 960 ml   Output 500 ml   Net 460 ml        CONSTITUTIONAL:    Alert, NAD  SKIN:     Severe diffuse arm > legs skin discoloration  HEENT:     EOMI, MMM, No thrush  NECK:    No bruits, No JVP apprechiated  CV:      Afib,  + murmur, No rubs, No gallops  PULMONARY:   Couse BS,  No Wheezing, No Rales, No Rhonchi      Rigth sided noted egophony, Kyphosis  ABDOMEN:     Soft, non-tender. BS normal. No R/R/G  EXT:    + deformities . No clubbing.       + upper and lower lower extremity edema, mild venous     stasis  PULSE:   Diminished palpable.   PSYCHIATRIC:  Seems appropriate, No acute psycosis  MS:    + fractures, Mild weakness  NEUROLOGIC:   The clinical assessment is non-focal     DATA: IMAGING & TESTING:     LABORATORY TESTS:    CBC:   Lab Results   Component Value Date    WBC 5.7 2020    RBC 2.94 2020    HGB 9.3 2020    HCT 30.0 2020    .0 2020    MCH 31.6 2020    MCHC 31.0 2020    RDW 16.4 2020     2020    MPV 9.4 2020     CMP:    Lab Results   Component Value Date     2020    K 4.8 2020    K 5.0 2020     2020    CO2 26 2020    BUN 49 2020    CREATININE 2.0 2020    GFRAA 29 2020    LABGLOM 24 2020    GLUCOSE 144 2020    PROT 5.7 2020    LABALBU 3.1 2020    CALCIUM 8.8 2020    BILITOT 0.6 2020    ALKPHOS 101 2020    AST 11 2020    ALT 6 2020     PT/INR:    Lab Results   Component Value Date    PROTIME 12.9 2020    INR 1.2 2020     ABG:  No results found for: PH, PCO2, PO2, HCO3, BE, THGB, TCO2, O2SAT     PRO-BNP: No results found for: PROBNP   ABGs: No results found for: PH, PO2, PCO2  Hemoglobin A1C: No components found for: HGBA1C    IMAGING:  Imaging tests were completed and reviewed and discussed radiology and care team involved and reveals   Xr Ankle Left (min 3 Views)  After ORIF and casting for fractures of the distal left tibia and fibula. Xr Chest Portable    Result Date: 2020  EXAMINATION: ONE XRAY VIEW OF THE CHEST 2020 11:42 pm COMPARISON: None. HISTORY: ORDERING SYSTEM PROVIDED HISTORY: fall TECHNOLOGIST PROVIDED HISTORY: Reason for exam:->fall What reading provider will be dictating this exam?->[facility] FINDINGS: The lungs are without acute focal process. There is no effusion or pneumothorax. Cardiomegaly. The osseous structures are without acute process. No evidence of acute trauma. SEVERE Cardiomegaly. Us Dup Upper Extremity Right Venous    Result Date: 2020  Patient MRN:  [id number] : 1940 Age: [de-identified] years Gender: Female Order Date:  2020 4:51 PM EXAM: US DUP UPPER EXTREMITY RIGHT VENOUS NUMBER OF IMAGES:  31 INDICATION:  edema, r/o DVT edema, r/o DVT What reading provider will be dictating this exam?->[facility] COMPARISON: None Within the visualized vessels, there is no evidence for deep venous thrombosis There is good compressibility, there is good augmentation, there is good color flow. Within the visualized vessels there is no evidence for deep venous thrombosis      Assessment:   1. Acute hypoxemic respiratory failure  2. Fracture after call  3. Anemia  4.  Hx of COPD reported  5. CKD        Plan:   1. On admission the CXR was under read but with severe cardiomegaly, on the 26 film clearly atelectasis +/_ pneumonia  2. US was negative for DVT  3. On bronchodilators  4. No LVEF assessment noted or in review  5. I/O seem adequate,   6. All treatment was covered and she will improved with time and increase PT  7. Ok to [facility] /discharge  8.            Yasmany Howard, MPH, Sukhwinder Cody  Professor of Internal Medicine  Pulmonary, Critical Care and Sleep Medicine

## 2020-12-01 NOTE — CARE COORDINATION
12/1/2020 social work transition of care planning  Pt plan is to [facility] has been obtained. Covid negative 11/30/20. Electronically signed by DANIELLE Braxton on 12/1/2020 at 7:53 AM     Addendum: Pt is set up for 1pm transport to [facility]. Sw will update facility [address] [facility] ,nurse (nurse to update pt).   Electronically signed by DANIELLE Braxton on 12/1/2020 at 10:24 AM

## 2020-12-01 NOTE — PROGRESS NOTES
Physical Therapy    NAME: Cm Casiano  : 1940  MRN: [id number]     Date of Service: 2020     Patient Diagnosis(es): The primary encounter diagnosis was Type I or II open fracture of left ankle, initial encounter. Diagnoses of General weakness, Renal insufficiency, and Ankle fracture, left, open type I or II, initial encounter were also pertinent to this visit.      has a past surgical history that includes Ankle fracture surgery (Left, 2020).    Referring Provider: Jo-Ann Sparks DO     Evaluating PT: Obed Wilson PT, [place] [id number]     [id number]  Diagnosis:  Ankle fracture L, closed, initial encounter  PMHx/PSHx:  None on file  Procedure/Surgery:    1.  Irrigation and debridement left grade 2 open ankle fracture including skin, subcutaneous tissue, muscle, and bone  2.  Open reduction internal fixation left bimalleolar ankle fracture  3.  Open reduction internal fixation left ankle syndesmosis  Precautions:  Falls, NWB LLE, poor skin integrity, Advanced DJD RUE shoulder. Equipment Needs:  TBD     SUBJECTIVE:     Pt lives with  in a 3 story home with 3 stairs to enter and 1 rail.  Bed is on first floor and bath is on first floor.  Pt ambulated with front [employer] PTA.  Pt reports she stays on first floor only.  Pt owns [facility] but does not use it.         OBJECTIVE:    Initial Evaluation  Date: 2020 Treatment   Short Term/ Long Term   Goals   AM-PAC 6 Clicks 78/27 5310     Was pt agreeable to Eval/treatment? yes yes     Does pt have pain? LLE soreness no     Bed Mobility  Rolling: Reji  Supine to sit: Reji  Sit to supine: Reji  Scooting: Reji Rolling:  mod for hips   Supine to sit:  Min A  Sit to supine:  NT  Scooting:  Min A to sitting EOB Rolling: Independent  Supine to sit: Independent  Sit to supine: Independent  Scooting: Independent   Transfers Sit to stand: NT, pt declined  Stand to sit: NT  Stand pivot: NT Sit to stand:   Max A.   R foot blocked from sliding. Using care pads to assist with lifting pt  Sit to stand: Reji  Stand to sit: Reji  Stand pivot: Reji front [address]   Ambulation    NT NT 10 feet with front [address] Reji NWB LLE   Stair negotiation: ascended and descended  NT NT NA   ROM BUE:  Per OT note  BLE:  WNL       Strength BUE:  Per OT note  BLE:  WNL       Balance Sitting EOB:  SBA  Dynamic Standing:  NT  sitting EOB:  Supervision  Static standing:  Min A with w/w Sitting EOB:  Independent  Dynamic Standing:  Reji front [address] with NWB LLE         Patient education  Pt educated on bed mobility and transfers .      Patient response to education:   Pt verbalized understanding Pt demonstrated skill Pt requires further education in this area   Yes  With cuing yes      ASSESSMENT:     Comments:  Pt had difficulty with lifting hips to roll to her right side. Pt required max cues for bed mobility. Pt reports her right leg is weaker and was not able to stand pt at walker with max of 2. Stood pt with no device with max assist and was using care pad under pt to assist with lifting pt. Pt removed 02 before sitting up and pt's pulse ox upon sitting on the eob was in low 80's. Donned 02 and pulse ox sitting after resting was 92%. Reviewed all exercises for pt to do in bed and encouraged pt to sit eob with assist to get up.        Treatment:  Patient practiced and was instructed in the following treatment:   · Functional mobility performed as documented above. No report of dizziness during functional mobility. Cueing required for hand placement during transfers and bed mobility. Once standing, pt able to maintain NWB left LE but not tolerating much. ·   · Pt education  · Therapeutic activity.         PLAN:     Patient is making good progress towards established goals. Will continue with current POC.      Time in  905  Time out  930     Total Treatment Time 25 minutes      CPT codes:     [x]? Therapeutic activities 39526 25 minutes  []?  Therapeutic exercises 31685  minutes     Jovita Beckham, [place]  [address]

## 2020-12-01 NOTE — PLAN OF CARE
Rebecca Plan in good spirits, asks for no pain meds. Am hygiene done. Tea w bkfst and lunch. Alert, conversant, partly ambulatory ( acc to PT note ) at time of dc. Ph conf re Efraín Best / Srinivasa louie w best and sandy who called.

## 2020-12-04 NOTE — DISCHARGE SUMMARY
Hospital Medicine Discharge Summary    Patient: Henry Land     Gender: female  : 1940   Age: [de-identified] y.o. MRN: [id number]    Code Status:  Full code    Primary Care Provider: Donis Haas MD    Admit Date: 2020   Discharge Date: 2020      Admitting Physician: Kathline Habermann, MD  Discharge Physician: Olive Blanca DO     Discharge Diagnoses: Active Hospital Problems    Diagnosis Date Noted    Pneumonia [J18.9] 2020    Edema of right upper extremity [R60.0] 2020    Chronic respiratory failure with hypoxia (HCC) [J96.11] 2020    HTN (hypertension) [I10] 2020    Hypothyroidism [E03.9]     Diastolic dysfunction [P68.15] 2020    Anticoagulated [Z79.01] 2020    Type I or II open fracture of left ankle [S82.892B] 2020    Weakness [R53.1] 2020    Ambulatory dysfunction [R26.2] 2020    Falls frequently [R29.6] 2020    Stage 4 chronic kidney disease ([facility] [N18.4] 2020    RBBB [I45.10] 2020    COPD (chronic obstructive pulmonary disease) ([facility] [J44.9] 2020    Tobacco dependence [F17.200] 2020    Atrial fibrillation ([facility] [I48.91] 2020    Type 2 diabetes mellitus ([facility] [E11.9] 2020   LLE ankle fracture     Hospital Course:   *LEFT ANKLE INJURY was the presentation,   was helping her off the commode and she slipped and fell and sustain a fracture to her left ankle. She had OR on . And is going to a [facility] today  **    Disposition:  Long-Term Acute Care    Exam:     /61   Pulse 86   Temp 98.1 °F (36.7 °C)   Resp 17   Ht 5' 2\" (1.575 m)   Wt 160 lb (72.6 kg)   SpO2 91%   BMI 29.26 kg/m²     Gen: *well developed  HEENT: NC/AT, moist mucous membranes, no oropharyngeal erythema or exudate  Neck: supple, trachea midline, no anterior cervical or SC LAD  Heart:  Normal s1/s2, RRR, no murmurs, gallops, or rubs.    Lungs:  cta* bilaterally, **Abd: bowel sounds present, soft, nontender, nondistended, no masses  Extrem:  No clubbing, cyanosis,  LLE** edema  Skin: no rashes or lesions  Psych: A & O x3  Neuro: grossly intact, moves all four extremities. Capillary Refill: Brisk,< 3 seconds   Peripheral Pulses: +2 palpable, equal bilaterally      Consults:     IP CONSULT TO ORTHOPEDIC SURGERY  IP CONSULT TO INTERNAL MEDICINE  IP CONSULT TO CASE MANAGEMENT  IP CONSULT TO SOCIAL WORK  IP CONSULT TO PULMONOLOGY    Labs: For convenience and continuity at follow-up the following most recent labs are provided:    Lab Results   Component Value Date    WBC 5.7 11/27/2020    HGB 9.3 11/27/2020    HCT 30.0 11/27/2020    .0 11/27/2020     11/27/2020     11/27/2020    K 4.8 11/27/2020    K 5.0 11/23/2020     11/27/2020    CO2 26 11/27/2020    BUN 49 11/27/2020    CREATININE 2.0 11/27/2020    CALCIUM 8.8 11/27/2020    ALKPHOS 101 11/21/2020    ALT 6 11/21/2020    AST 11 11/21/2020    BILITOT 0.6 11/21/2020    LABALBU 3.1 11/21/2020     Lab Results   Component Value Date    INR 1.2 11/21/2020       Radiology:  XR CHEST (2 VW)   Final Result   1. Cardiomegaly. There are no findings of failure. 2. Patchy bilateral airspace disease worrisome for viral pneumonia. US DUP UPPER EXTREMITY RIGHT VENOUS   Final Result   Within the visualized vessels there is no evidence for deep venous   thrombosis      XR CHEST PORTABLE   Final Result   Patchy bibasilar infiltrates. Cardiomegaly. There are no findings of failure. XR ANKLE LEFT (MIN 3 VIEWS)   Final Result   After ORIF and casting for fractures of the distal left tibia and fibula. FLUORO FOR SURGICAL PROCEDURES   Final Result   Intraprocedural fluoroscopic spot images as above. See separate procedure   report for more information. XR CHEST PORTABLE   Final Result   No evidence of acute trauma. Cardiomegaly.       XR ANKLE LEFT (MIN 3 VIEWS)   Final Result   Displaced fractures of distal shaft of the medial malleolus. Fine osseous   detail obscured by the overlapping cast.      XR ANKLE LEFT (2 VIEWS)   Final Result   Displaced fracture distal shaft of the fibula. Displaced malleolar fracture. Diffuse soft tissue edema. Discharge Medications:   Discharge Medication List as of 12/1/2020 10:58 AM      START taking these medications    Details   cefdinir (OMNICEF) 300 MG capsule Take 1 capsule by mouth every 12 hours for 7 doses, Disp-7 capsule,R-0Print      doxycycline hyclate (VIBRAMYCIN) 100 MG capsule Take 1 capsule by mouth every 12 hours for 7 doses, Disp-7 MGGVDFQ,W-1SVDFV      folic acid (FOLVITE) 1 MG tablet Take 1 tablet by mouth daily, Disp-30 tablet,R-0Print           Discharge Medication List as of 12/1/2020 10:58 AM        Discharge Medication List as of 12/1/2020 10:58 AM      CONTINUE these medications which have NOT CHANGED    Details   amLODIPine (NORVASC) 5 MG tablet Take 5 mg by mouth dailyHistorical Med      apixaban (ELIQUIS) 2.5 MG TABS tablet Take 2.5 mg by mouth 2 times dailyHistorical Med      furosemide (LASIX) 40 MG tablet Take 60 mg by mouth 2 times dailyHistorical Med      levothyroxine (SYNTHROID) 88 MCG tablet Take 88 mcg by mouth DailyHistorical Med      lisinopril (PRINIVIL;ZESTRIL) 10 MG tablet Take 12.5 mg by mouth dailyHistorical Med      metoprolol tartrate (LOPRESSOR) 25 MG tablet Take 6.25 mg by mouth 2 times dailyHistorical Med      lovastatin (MEVACOR) 10 MG tablet Take 10 mg by mouth nightlyHistorical Med      allopurinol (ZYLOPRIM) 300 MG tablet Take 300 mg by mouth dailyHistorical Med           Discharge Medication List as of 12/1/2020 10:58 AM      STOP taking these medications       oxyCODONE-acetaminophen (PERCOCET) 5-325 MG per tablet Comments:   Reason for Stopping:         potassium chloride (KLOR-CON) 10 MEQ extended release tablet Comments:   Reason for Stopping:                Follow-up appointments:  1 week    Provider Follow-up: pmd    Condition at Discharge:  Stable    The patient was seen and examined on day of discharge and this discharge summary is in conjunction with any daily progress note from day of discharge. Time Spent on discharge is 45 minutes  in the examination, evaluation, counseling and review of medications and discharge plan. Signed:    Lilliam Kelly   12/4/2020      Thank you Vinay Medina MD for the opportunity to be involved in this patient's care.  If you have any questions or concerns please feel free to contact me at [phone]

## 2020-12-08 ENCOUNTER — TELEPHONE (OUTPATIENT)
Dept: ORTHOPEDIC SURGERY | Age: 80
End: 2020-12-08

## 2020-12-08 NOTE — TELEPHONE ENCOUNTER
Andry Kendrick from [facility] called to r/s the pt. She needs her stitches removed. No available time on the schedule. Andry Kendrick can be reached at [phone]. Thank you.

## 2020-12-08 NOTE — TELEPHONE ENCOUNTER
[address] [place], [place] [address] Rd called again today states she went to see pt, had a bowel accident and soaked all ace bandage and cast smells very bad wants to know if they should get a orthopedic to go to the facility and see her.

## 2020-12-08 NOTE — TELEPHONE ENCOUNTER
Discussed with Dr. Sandra Esquivel. Patient was scheduled for 2 wk post op appt in our office 12/07/2020. Call placed to [address] at [facility]. OK to consult orthopedics to remove splint, re XRAY, remove sutures if incision well approximated, reapply splint. [address] will fax report to office when completed. Encouraged to call with questions or concerns.

## 2020-12-24 PROBLEM — Z01.818 PRE-OP EVALUATION: Status: RESOLVED | Noted: 2020-11-22 | Resolved: 2020-12-24

## 2025-04-21 NOTE — PLAN OF CARE
- daily CMP  - replace per PRN electrolyte protocol   Problem: Skin Integrity:  Goal: Absence of new skin breakdown  Description: Absence of new skin breakdown  11/26/2020 2346 by Andi Sexton RN  Outcome: Met This Shift  11/26/2020 1402 by Ryan Arevalo RN  Outcome: Met This Shift  11/26/2020 1147 by Ryan Arevalo RN  Outcome: Met This Shift  Goal: Risk for impaired skin integrity will decrease  Description: Risk for impaired skin integrity will decrease  Outcome: Met This Shift     Problem: Pain:  Goal: Pain level will decrease  Description: Pain level will decrease  Outcome: Met This Shift  Goal: Control of acute pain  Description: Control of acute pain  Outcome: Met This Shift  Goal: Control of chronic pain  Description: Control of chronic pain  Outcome: Met This Shift     Problem: Tissue Perfusion:  Goal: Adequacy of tissue perfusion will improve  Description: Adequacy of tissue perfusion will improve  Outcome: Met This Shift     Problem:  Activity:  Goal: Ability to tolerate increased activity will improve  Description: Ability to tolerate increased activity will improve  11/26/2020 2346 by Andi Sexton RN  Outcome: Ongoing  11/26/2020 1147 by Ryan Arevalo RN  Outcome: Met This Shift

## (undated) DEVICE — GOWN,BREATHABLE,IMP SLV 3XL AURORA: Brand: MEDLINE

## (undated) DEVICE — GOWN,BREATHABLE SLV,AURORA,XLG,STRL: Brand: MEDLINE

## (undated) DEVICE — DRESSING,GAUZE,XEROFORM,CURAD,1"X8",ST: Brand: CURAD

## (undated) DEVICE — NEEDLE HYPO 21GA L1.5IN GRN POLYPR HUB S STL REG BVL STR

## (undated) DEVICE — GAUZE,SPONGE,AVANT,4"X4",4PLY,STRL,10/TR: Brand: MEDLINE

## (undated) DEVICE — TUBING SUCT 12FR MAL ALUM SHFT FN CAP VENT UNIV CONN W/ OBT

## (undated) DEVICE — CLOTH SURG PREP PREOPERATIVE CHLORHEXIDINE GLUC 2% READYPREP

## (undated) DEVICE — SURGICAL PROCEDURE PACK BASIC

## (undated) DEVICE — INTENDED FOR TISSUE SEPARATION, AND OTHER PROCEDURES THAT REQUIRE A SHARP SURGICAL BLADE TO PUNCTURE OR CUT.: Brand: BARD-PARKER ® STAINLESS STEEL BLADES

## (undated) DEVICE — READY WET SKIN SCRUB TRAY-LF: Brand: MEDLINE INDUSTRIES, INC.

## (undated) DEVICE — DRAPE,REIN 53X77,STERILE: Brand: MEDLINE

## (undated) DEVICE — SCREW BNE L55MM DIA3.5MM CORT S STL ST NONCANNULATED LOK
Type: IMPLANTABLE DEVICE | Site: TIBIA | Status: NON-FUNCTIONAL
Removed: 2020-11-22

## (undated) DEVICE — SOLUTION IV IRRIG POUR BRL 0.9% SODIUM CHL 2F7124

## (undated) DEVICE — BANDAGE COMPR ELASTIC 4 IN STRL ACE

## (undated) DEVICE — PADDING,UNDERCAST,COTTON, 4"X4YD STERILE: Brand: MEDLINE

## (undated) DEVICE — SPLINT ORTH W5XL30IN PLSTR OF PARIS FAST IMMOB OF FRAC HI

## (undated) DEVICE — GLOVE ORANGE PI 7   MSG9070

## (undated) DEVICE — BIT DRL L110MM DIA2.5MM ST G QUIK CPL NONRADIOPAQUE W/O STP

## (undated) DEVICE — GLOVE ORANGE PI 8   MSG9080

## (undated) DEVICE — GLOVE SURG SZ 8 L12IN FNGR THK79MIL GRN LTX FREE

## (undated) DEVICE — Z INACTIVE USE 2660664 SOLUTION IRRIG 3000ML 0.9% SOD CHL USP UROMATIC PLAS CONT

## (undated) DEVICE — BANDAGE,GAUZE,4.5"X4.1YD,STERILE,LF: Brand: MEDLINE

## (undated) DEVICE — GUIDEWIRE ORTH L150MM DIA1.25MM S STL NTHRD FOR 4MM CANN

## (undated) DEVICE — SET ORTHO STD STORTSTD1

## (undated) DEVICE — YANKAUER,OPEN TIP,W/O VENT,STERILE: Brand: MEDLINE INDUSTRIES, INC.

## (undated) DEVICE — DRAPE C ARM W41XL74IN UNIV MOB W RUBBERBAND CLP

## (undated) DEVICE — GLOVE ORANGE PI 7 1/2   MSG9075

## (undated) DEVICE — TOTAL KNEE PK

## (undated) DEVICE — SOLUTION IV IRRIG WATER 1000ML POUR BRL 2F7114

## (undated) DEVICE — APPLICATOR MEDICATED 26 CC SOLUTION HI LT ORNG CHLORAPREP

## (undated) DEVICE — SYRINGE MED 10ML TRNSLUC BRL PLUNG BLK MRK POLYPR CTRL

## (undated) DEVICE — SET IRR L100IN DIA0.280IN C100ML 2 NVENT PIERCING PINS 3

## (undated) DEVICE — SHEET,DRAPE,53X77,STERILE: Brand: MEDLINE

## (undated) DEVICE — PATIENT RETURN ELECTRODE, SINGLE-USE, CONTACT QUALITY MONITORING, ADULT, WITH 9FT CORD, FOR PATIENTS WEIGING OVER 33LBS. (15KG): Brand: MEGADYNE

## (undated) DEVICE — GOWN,AURORA,BRTHSLV,2XL,18/CS: Brand: MEDLINE

## (undated) DEVICE — BIT DRL L160MM DIA2.7MM ST CANN QUIK CPL NONRADIOLUCENT ADJ

## (undated) DEVICE — DRIP REDUCTION MANIFOLD

## (undated) DEVICE — DRESSING GZ W1XL8IN COT XRFRM N ADH OVERWRAP CURAD

## (undated) DEVICE — BANDAGE COMPR W4INXL10YD WHITE/BEIGE E MTRX HK LOOP CLSR

## (undated) DEVICE — 3M™ STERI-DRAPE™ U-DRAPE 1015: Brand: STERI-DRAPE™

## (undated) DEVICE — DRILL SYSTEM 7

## (undated) DEVICE — SET ORTHO STD STORTSTD2

## (undated) DEVICE — Z DISCONTINUED USE 2275686 GLOVE SURG SZ 8 L12IN FNGR THK13MIL WHT ISOLEX POLYISOPRENE

## (undated) DEVICE — ZIMMER® STERILE DISPOSABLE TOURNIQUET CUFF WITH PROTECTIVE SLEEVE AND PLC, DUAL PORT, SINGLE BLADDER, 34 IN. (86 CM)